# Patient Record
Sex: FEMALE | Race: WHITE | NOT HISPANIC OR LATINO | Employment: FULL TIME | URBAN - METROPOLITAN AREA
[De-identification: names, ages, dates, MRNs, and addresses within clinical notes are randomized per-mention and may not be internally consistent; named-entity substitution may affect disease eponyms.]

---

## 2017-10-28 ENCOUNTER — ANESTHESIA - HEALTHEAST (OUTPATIENT)
Dept: OBGYN | Facility: CLINIC | Age: 32
End: 2017-10-28

## 2017-10-28 ENCOUNTER — RECORDS - HEALTHEAST (OUTPATIENT)
Dept: ADMINISTRATIVE | Facility: OTHER | Age: 32
End: 2017-10-28

## 2017-10-30 ENCOUNTER — HOME CARE/HOSPICE - HEALTHEAST (OUTPATIENT)
Dept: HOME HEALTH SERVICES | Facility: HOME HEALTH | Age: 32
End: 2017-10-30

## 2017-11-01 ENCOUNTER — HOME CARE/HOSPICE - HEALTHEAST (OUTPATIENT)
Dept: HOME HEALTH SERVICES | Facility: HOME HEALTH | Age: 32
End: 2017-11-01

## 2017-11-03 ENCOUNTER — COMMUNICATION - HEALTHEAST (OUTPATIENT)
Dept: OBGYN | Facility: CLINIC | Age: 32
End: 2017-11-03

## 2017-11-20 ENCOUNTER — RECORDS - HEALTHEAST (OUTPATIENT)
Dept: ADMINISTRATIVE | Facility: OTHER | Age: 32
End: 2017-11-20

## 2018-04-17 ENCOUNTER — TELEPHONE (OUTPATIENT)
Dept: OTHER | Facility: CLINIC | Age: 33
End: 2018-04-17

## 2018-04-17 NOTE — TELEPHONE ENCOUNTER
4/17/2018    Call Regarding Onboarding [employer] OTHER    Attempt 1    Message on voicemail     Comments:       Outreach   SB

## 2018-05-09 NOTE — TELEPHONE ENCOUNTER
5/9/2018    Call Regarding Onboarding [employer] [place] [facility] OTHER    Attempt 2    Message on voicemail     Comments:       Outreach   JAZZ

## 2018-06-05 NOTE — TELEPHONE ENCOUNTER
6/5/2018    Call Regarding Onboarding [employer] [place] [facility] OTHER    Attempt 3    Message on voicemail     Comments:       Outreach   JAZZ

## 2019-11-15 ENCOUNTER — RECORDS - HEALTHEAST (OUTPATIENT)
Dept: ADMINISTRATIVE | Facility: OTHER | Age: 34
End: 2019-11-15

## 2020-01-03 ENCOUNTER — ANESTHESIA - HEALTHEAST (OUTPATIENT)
Dept: OBGYN | Facility: CLINIC | Age: 35
End: 2020-01-03

## 2020-01-04 ENCOUNTER — HOME CARE/HOSPICE - HEALTHEAST (OUTPATIENT)
Dept: HOME HEALTH SERVICES | Facility: HOME HEALTH | Age: 35
End: 2020-01-04

## 2021-02-04 ENCOUNTER — TRANSFERRED RECORDS (OUTPATIENT)
Dept: HEALTH INFORMATION MANAGEMENT | Facility: CLINIC | Age: 36
End: 2021-02-04

## 2021-02-05 ENCOUNTER — MEDICAL CORRESPONDENCE (OUTPATIENT)
Dept: HEALTH INFORMATION MANAGEMENT | Facility: CLINIC | Age: 36
End: 2021-02-05

## 2021-02-05 ENCOUNTER — AMBULATORY - HEALTHEAST (OUTPATIENT)
Dept: MATERNAL FETAL MEDICINE | Facility: HOSPITAL | Age: 36
End: 2021-02-05

## 2021-02-05 ENCOUNTER — TRANSCRIBE ORDERS (OUTPATIENT)
Dept: MATERNAL FETAL MEDICINE | Facility: CLINIC | Age: 36
End: 2021-02-05

## 2021-02-05 DIAGNOSIS — O26.90 PREGNANCY RELATED CONDITION, ANTEPARTUM: Primary | ICD-10-CM

## 2021-02-09 ENCOUNTER — TELEPHONE (OUTPATIENT)
Dept: MATERNAL FETAL MEDICINE | Facility: CLINIC | Age: 36
End: 2021-02-09

## 2021-02-09 NOTE — TELEPHONE ENCOUNTER
Writer called and spoke with Gely regarding Gely wanting a 2nd opinion for urology.  [facility] recommends pt see Dr. Talamantes at [facility].  Order was placed in [facility] and Urology was called who said they will call the pt after it is reviewed as they don't have apts in 1-2 weeks Pt has records in Care Every[facility] from [facility]. Pt is seeing Miller this week as well, but would like another opinion. Candi Stringer RN     0 = independent

## 2021-02-10 NOTE — TELEPHONE ENCOUNTER
MEDICAL RECORDS REQUEST   [facility]  Urology Clinic  [address] [place] [address]  PHONE: [phone]  Fax: [phone]        FUTURE VISIT INFORMATION                                                   Gely Christianson : 1985 scheduled for future visit at [facility]    APPOINTMENT INFORMATION:    Date: 2021 10AM    Provider:  Blake LUNA    Reason for Visit/Diagnosis: UPJ     REFERRAL INFORMATION:    Referring provider:  N/A    Specialty: N/A    Referring providers clinic:      Clinic contact number:  N/A    RECORDS REQUESTED FOR VISIT                                                     NOTES  STATUS/DETAILS   OFFICE NOTE from referring provider  yes   OFFICE NOTE from other specialist  no   DISCHARGE SUMMARY from hospital  yes   DISCHARGE REPORT from the ER  yes   OPERATIVE REPORT  no   MEDICATION LIST  no     PRE-VISIT CHECKLIST      Record collection complete Yes  Images in [facility] PACS   MR 21  US ABD 21   Appointment appropriately scheduled           (right time/right provider) Yes   MyChart activation If no, please explain: in process    Questionnaire complete If no, please explain: in process      Action Katey 2/15/21 12:58PM   Action Taken CSS called to inform pt all images are in [facility] PACS for provider to review for consult.      Completed by: Katey Dye

## 2021-02-15 ENCOUNTER — PRE VISIT (OUTPATIENT)
Dept: UROLOGY | Facility: CLINIC | Age: 36
End: 2021-02-15

## 2021-02-19 ENCOUNTER — PRE VISIT (OUTPATIENT)
Dept: UROLOGY | Facility: CLINIC | Age: 36
End: 2021-02-19

## 2021-02-19 ENCOUNTER — VIRTUAL VISIT (OUTPATIENT)
Dept: UROLOGY | Facility: CLINIC | Age: 36
End: 2021-02-19
Payer: COMMERCIAL

## 2021-02-19 VITALS — WEIGHT: 160 LBS | HEIGHT: 68 IN | BODY MASS INDEX: 24.25 KG/M2

## 2021-02-19 DIAGNOSIS — N13.5 UPJ (URETEROPELVIC JUNCTION) OBSTRUCTION: Primary | ICD-10-CM

## 2021-02-19 DIAGNOSIS — Z33.1 PREGNANT STATE, INCIDENTAL: ICD-10-CM

## 2021-02-19 PROCEDURE — 99204 OFFICE O/P NEW MOD 45 MIN: CPT | Mod: GT | Performed by: STUDENT IN AN ORGANIZED HEALTH CARE EDUCATION/TRAINING PROGRAM

## 2021-02-19 RX ORDER — PRENATAL VIT/IRON FUM/FOLIC AC 27MG-0.8MG
1 TABLET ORAL DAILY
COMMUNITY

## 2021-02-19 ASSESSMENT — MIFFLIN-ST. JEOR: SCORE: 1459.26

## 2021-02-19 ASSESSMENT — PAIN SCALES - GENERAL: PAINLEVEL: NO PAIN (0)

## 2021-02-19 NOTE — PROGRESS NOTES
Gely is a 35 year old who is being evaluated via a billable video visit.      How would you like to obtain your AVS? MyChart  If the video visit is dropped, the invitation should be resent by: Send to e-mail at: [email or web address]  Will anyone else be joining your video visit? No      Video Start Time: 10:04 AM  Video-Visit Details    Type of service:  Video Visit    Video End Time:10:22 AM    Originating Location (pt. Location): Home    Distant Location (provider location):  [facility] [place]     Platform used for Video Visit: TeaMobi         Chief Complaint:    Hydronephrosis           Consult or Referral:     Mr. Gely Christianson is a 35 year old female seen at the request of Dr. Jackson.         History of Present Illness:    Gely Christianson is a very pleasant 35 year old female who presents with a history of Hydronephrosis Right Kidney with flank pain.   accompanied by her NA:  Reviewed previous notes from   Gely is currently doing well after that one episode of right flank pain which led to the diagnosis of right UPJ obstruction. SHe has had a MRI and has seen 2 other Urologists at  and [place] and is here to seek our opinion.  Gely is currently 6 weeks pregnant and has important questions to answer regarding the etiology, current management and possible needs for intervention for her UPJ obstruction.               Past Medical History:   No past medical history on file.         Past Surgical History:   No past surgical history on file.         Medications     Current Outpatient Medications   Medication     Prenatal Vit-Fe Fumarate-FA (PRENATAL MULTIVITAMIN W/IRON) 27-0.8 MG tablet     No current facility-administered medications for this visit.             Family History:   No family history on file.         Social History:     Social History     Socioeconomic History     Marital status: Not on file     Spouse name: Not on file     Number of children: Not on file     Years of  education: Not on file     Highest education level: Not on file   Occupational History     Not on file   Social Needs     Financial resource strain: Not on file     Food insecurity     Worry: Not on file     Inability: Not on file     Transportation needs     Medical: Not on file     Non-medical: Not on file   Tobacco Use     Smoking status: Never Smoker     Smokeless tobacco: Never Used   Substance and Sexual Activity     Alcohol use: Not on file     Drug use: Not on file     Sexual activity: Not on file   Lifestyle     Physical activity     Days per week: Not on file     Minutes per session: Not on file     Stress: Not on file   Relationships     Social connections     Talks on phone: Not on file     Gets together: Not on file     Attends Evangelical service: Not on file     Active member of club or organization: Not on file     Attends meetings of clubs or organizations: Not on file     Relationship status: Not on file     Intimate partner violence     Fear of current or ex partner: Not on file     Emotionally abused: Not on file     Physically abused: Not on file     Forced sexual activity: Not on file   Other Topics Concern     Not on file   Social History Narrative     Not on file            Allergies:   Patient has no known allergies.         Review of Systems:  From intake questionnaire     Skin: negative  Eyes: negative  Ears/Nose/Throat: negative  Respiratory: No shortness of breath, dyspnea on exertion, cough, or hemoptysis  Cardiovascular: No chest pain or palpitations  Gastrointestinal: negative; no nausea/vomiting, constipation or diarrhea  Genitourinary: as per HPI  Musculoskeletal: negative  Neurologic: negative  Psychiatric: negative  Hematologic/Lymphatic/Immunologic: negative  Endocrine: negative         Physical Exam:   This is a virtual visit    Alert, no acute distress, oriented, conversant    Ears/nose/mouth: mouth:normal, good dentition  Respiratory: no respiratory distress, or pursed lip  breathing  Cardiovascular:no obvious jugular venous distension present  Skin: no suspicious lesions or rashes on Visible body parts on the Screen  Neuro: Alert, oriented, speech and mentation normal  Psych: affect and mood normal, alert and oriented to person, place and time      Labs and Pathology:    The following labs were reviewed by me and discussed with the patient:  Creatinine: Normal  Significant for : CR:1.0 on 1/31          Imaging:    The following imaging exams were independently viewed and interpreted by me and discussed with patient:  MRI Abd/Pelvis: Abnormal:   EXAM: MR ABDOMEN WITHOUT CONTRAST  LOCATION: [facility] MEDICAL IMAGING  DATE/TIME: 01/30/2021, 12:35 PM    INDICATION: Renal mass, normal renal function.  COMPARISON: Ultrasound abdomen 01/30/2021.  TECHNIQUE: Routine MRI liver protocol including T1 in/out phase, diffusion, multiplane T2, and dynamic T1 with IV contrast.    CONTRAST: None.    FINDINGS:     KIDNEYS:   Right: Severe hydronephrosis with marked thinning of renal parenchyma and abrupt transition at the ureteropelvic junction. Findings should represent chronic high-grade uteropelvic junction obstruction. No soft tissue mass demonstrated.  Left: There are a few cysts in the upper pole which have a benign appearance. No hydronephrosis. Left kidney otherwise negative.    ADDITIONAL FINDINGS: The visualized liver, gallbladder, bile ducts, spleen, pancreas, adrenals, and aorta are all negative. Normal appearance of the bowel in the upper abdomen. Trace amount of free fluid in the pelvic cul-de-sac.    IMPRESSION:  1.  Findings consistent with chronic high-grade right ureteropelvic junction obstruction.             Assessment and Plan:     Assessment:     UPJ (ureteropelvic junction) obstruction  We discussed about the possible congenital etiology and variable time at presentation and the likely modes of presentation for UPJ obstruction.  We also discussed that she may have further symptoms or  may remain asymptomatic during the course of her pregnancy.  IF pain becomes a recurrent issue, and we need to intervene, the choice is between a DJ stent and a Percutaneous nephrostomy. We discussed the pros and the cons of each intervention.  We also discussed about a follow up imaging (US Renal) which she can coordinate at the time of fetal anomaly scan for her baby.  We will obtain a renogram after childbirth in November and I have put orders for the same.  - NM Renogram with Lasix; Future    Pregnant state, incidental        Plan:  USG at the time of anomaly scan   DMSA Renogram after childbirth    Orders  Orders Placed This Encounter   Procedures     NM Renogram with Lasix         Karson Lozano MD  [facility] [place]                  ==========================    Additional Billing and Coding Information:  Review of external notes as documented above   Review of prior external note(s) from [facility] information from [place] reviewed  Review of the result(s) of each unique test - Creatinine    Independent interpretation of a test performed by another physician/other qualified health care professional (not separately reported) - MRI Abdomen and US abdomen    Discussion of management or test interpretation with external physician/other qualified healthcare professional/appropriate source - NA    Diagnosis or treatment significantly limited by social determinants of health - NA    53 minutes spent on the date of the encounter doing chart review, review of outside records, review of test results, interpretation of tests, patient visit and documentation     ==========================

## 2021-02-19 NOTE — PATIENT INSTRUCTIONS
Ultrasound Kidneys at the time of anomaly scan for the pregnancy  Will do Renal scan (Renogram) after childbirth.

## 2021-02-19 NOTE — LETTER
2/19/2021       RE: Gely Christianson  [address] [place] [address]     Dear Colleague,    Thank you for referring your patient, Gely Christianson, to the [facility] [place] at [facility]. Please see a copy of my visit note below.    Gely is a 35 year old who is being evaluated via a billable video visit.      How would you like to obtain your AVS? MyChart  If the video visit is dropped, the invitation should be resent by: Send to e-mail at: [email or web address]  Will anyone else be joining your video visit? No      Video Start Time: 10:04 AM  Video-Visit Details    Type of service:  Video Visit    Video End Time:10:22 AM    Originating Location (pt. Location): Home    Distant Location (provider location):  [facility] [place]     Platform used for Video Visit: TuckerNuck         Chief Complaint:    Hydronephrosis           Consult or Referral:     Mr. Gely Christianson is a 35 year old female seen at the request of Dr. Jackson.         History of Present Illness:    Gely Christianson is a very pleasant 35 year old female who presents with a history of Hydronephrosis Right Kidney with flank pain.   accompanied by her NA:  Reviewed previous notes from   Gely is currently doing well after that one episode of right flank pain which led to the diagnosis of right UPJ obstruction. SHe has had a MRI and has seen 2 other Urologists at  and [place] and is here to seek our opinion.  Gely is currently 6 weeks pregnant and has important questions to answer regarding the etiology, current management and possible needs for intervention for her UPJ obstruction.               Past Medical History:   No past medical history on file.         Past Surgical History:   No past surgical history on file.         Medications     Current Outpatient Medications   Medication     Prenatal Vit-Fe Fumarate-FA (PRENATAL MULTIVITAMIN W/IRON) 27-0.8  MG tablet     No current facility-administered medications for this visit.             Family History:   No family history on file.         Social History:     Social History     Socioeconomic History     Marital status: Not on file     Spouse name: Not on file     Number of children: Not on file     Years of education: Not on file     Highest education level: Not on file   Occupational History     Not on file   Social Needs     Financial resource strain: Not on file     Food insecurity     Worry: Not on file     Inability: Not on file     Transportation needs     Medical: Not on file     Non-medical: Not on file   Tobacco Use     Smoking status: Never Smoker     Smokeless tobacco: Never Used   Substance and Sexual Activity     Alcohol use: Not on file     Drug use: Not on file     Sexual activity: Not on file   Lifestyle     Physical activity     Days per week: Not on file     Minutes per session: Not on file     Stress: Not on file   Relationships     Social connections     Talks on phone: Not on file     Gets together: Not on file     Attends Sabianist service: Not on file     Active member of club or organization: Not on file     Attends meetings of clubs or organizations: Not on file     Relationship status: Not on file     Intimate partner violence     Fear of current or ex partner: Not on file     Emotionally abused: Not on file     Physically abused: Not on file     Forced sexual activity: Not on file   Other Topics Concern     Not on file   Social History Narrative     Not on file            Allergies:   Patient has no known allergies.         Review of Systems:  From intake questionnaire     Skin: negative  Eyes: negative  Ears/Nose/Throat: negative  Respiratory: No shortness of breath, dyspnea on exertion, cough, or hemoptysis  Cardiovascular: No chest pain or palpitations  Gastrointestinal: negative; no nausea/vomiting, constipation or diarrhea  Genitourinary: as per HPI  Musculoskeletal:  negative  Neurologic: negative  Psychiatric: negative  Hematologic/Lymphatic/Immunologic: negative  Endocrine: negative         Physical Exam:   This is a virtual visit    Alert, no acute distress, oriented, conversant    Ears/nose/mouth: mouth:normal, good dentition  Respiratory: no respiratory distress, or pursed lip breathing  Cardiovascular:no obvious jugular venous distension present  Skin: no suspicious lesions or rashes on Visible body parts on the Screen  Neuro: Alert, oriented, speech and mentation normal  Psych: affect and mood normal, alert and oriented to person, place and time      Labs and Pathology:    The following labs were reviewed by me and discussed with the patient:  Creatinine: Normal  Significant for : CR:1.0 on 1/31          Imaging:    The following imaging exams were independently viewed and interpreted by me and discussed with patient:  MRI Abd/Pelvis: Abnormal:   EXAM: MR ABDOMEN WITHOUT CONTRAST  LOCATION: [facility] MEDICAL IMAGING  DATE/TIME: 01/30/2021, 12:35 PM    INDICATION: Renal mass, normal renal function.  COMPARISON: Ultrasound abdomen 01/30/2021.  TECHNIQUE: Routine MRI liver protocol including T1 in/out phase, diffusion, multiplane T2, and dynamic T1 with IV contrast.    CONTRAST: None.    FINDINGS:     KIDNEYS:   Right: Severe hydronephrosis with marked thinning of renal parenchyma and abrupt transition at the ureteropelvic junction. Findings should represent chronic high-grade uteropelvic junction obstruction. No soft tissue mass demonstrated.  Left: There are a few cysts in the upper pole which have a benign appearance. No hydronephrosis. Left kidney otherwise negative.    ADDITIONAL FINDINGS: The visualized liver, gallbladder, bile ducts, spleen, pancreas, adrenals, and aorta are all negative. Normal appearance of the bowel in the upper abdomen. Trace amount of free fluid in the pelvic cul-de-sac.    IMPRESSION:  1.  Findings consistent with chronic high-grade right  ureteropelvic junction obstruction.             Assessment and Plan:     Assessment:     UPJ (ureteropelvic junction) obstruction  We discussed about the possible congenital etiology and variable time at presentation and the likely modes of presentation for UPJ obstruction.  We also discussed that she may have further symptoms or may remain asymptomatic during the course of her pregnancy.  IF pain becomes a recurrent issue, and we need to intervene, the choice is between a DJ stent and a Percutaneous nephrostomy. We discussed the pros and the cons of each intervention.  We also discussed about a follow up imaging (US Renal) which she can coordinate at the time of fetal anomaly scan for her baby.  We will obtain a renogram after childbirth in November and I have put orders for the same.  - NM Renogram with Lasix; Future    Pregnant state, incidental        Plan:  USG at the time of anomaly scan   DMSA Renogram after childbirth    Orders  Orders Placed This Encounter   Procedures     NM Renogram with Lasix         Karson Lozano MD  [facility] [place]                  ==========================    Additional Billing and Coding Information:  Review of external notes as documented above   Review of prior external note(s) from - [facility] information from [place] reviewed  Review of the result(s) of each unique test - Creatinine    Independent interpretation of a test performed by another physician/other qualified health care professional (not separately reported) - MRI Abdomen and US abdomen    Discussion of management or test interpretation with external physician/other qualified healthcare professional/appropriate source - NA    Diagnosis or treatment significantly limited by social determinants of health - NA    53 minutes spent on the date of the encounter doing chart review, review of outside records, review of test results, interpretation of tests, patient visit and documentation      ==========================  Again, thank you for allowing me to participate in the care of your patient.      Sincerely,    Karson Lozano MD

## 2021-02-24 LAB
ABO (EXTERNAL): NORMAL
HEMOGLOBIN (EXTERNAL): 13.2 G/DL (ref 10.5–14)
HEPATITIS B SURFACE ANTIGEN (EXTERNAL): NEGATIVE
HIV1+2 AB SERPL QL IA: NEGATIVE
PLATELET COUNT (EXTERNAL): 174 10E3/UL (ref 110–350)
RH (EXTERNAL): POSITIVE
RUBELLA ANTIBODY IGG (EXTERNAL): NORMAL

## 2021-03-07 ENCOUNTER — HEALTH MAINTENANCE LETTER (OUTPATIENT)
Age: 36
End: 2021-03-07

## 2021-03-19 ENCOUNTER — RECORDS - HEALTHEAST (OUTPATIENT)
Dept: ADMINISTRATIVE | Facility: OTHER | Age: 36
End: 2021-03-19

## 2021-03-24 ENCOUNTER — HOSPITAL ENCOUNTER (OUTPATIENT)
Dept: ULTRASOUND IMAGING | Facility: CLINIC | Age: 36
Discharge: HOME | End: 2021-03-24
Attending: OBSTETRICS & GYNECOLOGY

## 2021-03-24 DIAGNOSIS — O26.839: ICD-10-CM

## 2021-03-26 ENCOUNTER — TRANSCRIBE ORDERS (OUTPATIENT)
Dept: MATERNAL FETAL MEDICINE | Facility: CLINIC | Age: 36
End: 2021-03-26

## 2021-03-26 DIAGNOSIS — O26.90 PREGNANCY RELATED CONDITION, ANTEPARTUM: Primary | ICD-10-CM

## 2021-03-29 DIAGNOSIS — O26.90 PREGNANCY RELATED CONDITION, ANTEPARTUM: Primary | ICD-10-CM

## 2021-04-23 ENCOUNTER — AMBULATORY - HEALTHEAST (OUTPATIENT)
Dept: SCHEDULING | Facility: CLINIC | Age: 36
End: 2021-04-23

## 2021-04-23 DIAGNOSIS — O26.839: ICD-10-CM

## 2021-05-12 ENCOUNTER — PRE VISIT (OUTPATIENT)
Dept: MATERNAL FETAL MEDICINE | Facility: CLINIC | Age: 36
End: 2021-05-12

## 2021-05-14 NOTE — PROGRESS NOTES
Maternal-Fetal Medicine Consultation    Gely Christianson  : 1985  MRN: [id number]    REFERRAL:  Gely Christianson is a 35 year old sent by Dr. Keane from [facility] for MFM consultation.    HPI:  Gely Christianson is a 35 year old  at 21w1d by LMP consistent with 6w0d US here for MFM consultation regarding her recent diagnosis of chronic UPJ obstruction and resulting kidney damage.    In the first trimester of this pregnancy, Gely developed acute onset right flank pain. Renal ultrasound was performed at that time and she was noted to have grade 4 hydronephrosis of the right kidney with minimal remaining parenchyma. The left kidney appeared overall normal. MRI was also performed and and changes were consistent with chronic high grade right ureteropelvic junction obstruction. She had no prior history of renal problems, renal stones, hematuria, or recurrent urinary tract infections.  It was suspected that the change in her kidney was secondary to congenital ureteropelvic junction obstruction. She was hospitalized for her renal pain for just one night after which her pain resolved.      She has also met with Urology at [facility] on 2/10/21.  They concur that her renal changes were consistent with chronic right UPJ obstruction with minimal residual functional parenchyma. Recommendations during pregnancy was for close observation and conservative management given the chronic nature of her disease and her preserved renal function.  They reviewed that if her pain returned, that she would potentially require a percutaneous nephrostomy tube versus indwelling stent.    Today she states that she is feeling well. She has not had any repeat episodes of pain. She has questions regarding the COVID vaccine in pregnancy.    Prenatal Care:  Primary OB care this pregnancy has been with Dr. Keane from [facility].    Obstetrics History:  OB History    Para Term  " AB Living   3 2 2 0 0 2   SAB TAB Ectopic Multiple Live Births   0 0 0 0 2      # Outcome Date GA Lbr Shaq/2nd Weight Sex Delivery Anes PTL Lv   3 Current            2 Term 20 41w0d       JASS   1 Term 10/29/17 40w0d    Vag-Vacuum   JASS       Past Medical History:  Past Medical History:   Diagnosis Date     Renal disease     chronic UPJ obstruction of right kidney       Past Surgical History:  Past Surgical History:   Procedure Laterality Date     TOOTH EXTRACTION         Current Medications:  Prior to Admission medications    Medication Sig Last Dose Taking? Auth Provider   Prenatal Vit-Fe Fumarate-FA (PRENATAL MULTIVITAMIN W/IRON) 27-0.8 MG tablet Take 1 tablet by mouth daily   Reported, Patient       Allergies:  Patient has no known allergies.    Social History:   , two previous children  Denies tobacco, alcohol, or other illicit drug use.    Family History:  Non-contributory    ROS:  10-point ROS negative except as in HPI     PHYSICAL EXAM:  Deferred     Imaging:   Please see \"Imaging\" tab under \"Chart Review\" for details of today's US at the [facility].    ASSESSMENT/PLAN:  Gely Christianson is a 35 year old  at 21w1d by LMP consistent with 6w0d US here for [facility] consultation regarding her recent diagnosis of chronic UPJ obstruction and resulting kidney damage.    Renal disease in pregnancy  - Reviewed the chronic nature of her disease that has resulted in atrophic right kidney.  Reiterated that this was likely a congenital process that was undetected and overtime resulted in these changes.  Agree with urological assessment that expectant management at this time is most appropriate. Reviewed that physiological changes in pregnancy could result in recurrent or worsened pain. If this does develop interventions as discussed by urology would be available.  - From our perspective, we have reviewed the anatomy of her current fetus and the renal anatomy appears normal suggesting that " there is no UPJ obstruction for this baby.  In addition, we reviewed that our goal was to not only take care of any pain that develops from her chronic renal changes, but also to protect her working kidney. Therefore, it will be important to have a low threshold to screen for urinary tract infection.  We reviewed if a urinary tract infection did develop, it would be recommended that she be started on antibiotic prophylaxis in pregnancy as a precautionary measure. We also reviewed the recommendation for a daily 81 mg aspirin for preeclampsia prophylaxis due to her advanced maternal age and renal disease.    COVID vaccination in pregnancy  - We also discussed the safety of the mRNA Covid vaccine and that overall the risks of the virus, particularly to pregnant patients, are much greater than potential vaccine side effects. The pro and unknowns of Covid vaccination and safe practices to minimize infection (masking, social distancing, hand washing, avoidance of large gatherings) were also reviewed. She will consider as pregnancy progresses and is encouraged to ask more questions of her providers along the way.  Discussed the [place] Journal of Medicine article on the study which reviewed the pregnancy registry of over 30,000 women, which found that there was not an increased risk of miscarriage, still birth, congenital malformations or adverse  outcomes like  birth, small for gestational age or  deaths in patients who had received the vaccine.     Recommendations:  - COVID vaccinations encouraged  - Low dose aspirin for preeclampsia prevention to be initiated now  - Careful screening for urinary tract infection. Low threshold to screen and/or treat for UTI. If she does develop a urinary tract infection it is recommended that she be put on antibiotic prophylaxis/suppresion for the remainder of her pregnancy.  - If pain recurs she will need to be seen by urology for evaluation for PNT/ureteral  stent per their recommendation  - At this time there is no indication for an early delivery or other changes to obstetrical care.    The patient was seen and evaluated with Dr. Monzon    Thank you for allowing us to participate in the care of your patient. Please do not hesitate to contact us if you have further questions regarding the management of your patient.     Cray Nelson MD  Maternal Fetal Medicine Fellow  5/18/2021 4:25 PM

## 2021-05-18 ENCOUNTER — HOSPITAL ENCOUNTER (OUTPATIENT)
Dept: ULTRASOUND IMAGING | Facility: CLINIC | Age: 36
End: 2021-05-18
Attending: OBSTETRICS & GYNECOLOGY
Payer: COMMERCIAL

## 2021-05-18 ENCOUNTER — OFFICE VISIT (OUTPATIENT)
Dept: MATERNAL FETAL MEDICINE | Facility: CLINIC | Age: 36
End: 2021-05-18
Attending: OBSTETRICS & GYNECOLOGY
Payer: COMMERCIAL

## 2021-05-18 VITALS
SYSTOLIC BLOOD PRESSURE: 115 MMHG | RESPIRATION RATE: 18 BRPM | OXYGEN SATURATION: 98 % | DIASTOLIC BLOOD PRESSURE: 59 MMHG | HEART RATE: 65 BPM

## 2021-05-18 DIAGNOSIS — O09.522 SUPERVISION OF ELDERLY MULTIGRAVIDA IN SECOND TRIMESTER: Primary | ICD-10-CM

## 2021-05-18 DIAGNOSIS — O26.90 PREGNANCY RELATED CONDITION, ANTEPARTUM: ICD-10-CM

## 2021-05-18 PROCEDURE — 76811 OB US DETAILED SNGL FETUS: CPT | Mod: 26 | Performed by: OBSTETRICS & GYNECOLOGY

## 2021-05-18 PROCEDURE — 96040 HC GENETIC COUNSELING, EACH 30 MINUTES: CPT | Performed by: GENETIC COUNSELOR, MS

## 2021-05-18 PROCEDURE — 99202 OFFICE O/P NEW SF 15 MIN: CPT | Mod: 25 | Performed by: OBSTETRICS & GYNECOLOGY

## 2021-05-18 PROCEDURE — 76811 OB US DETAILED SNGL FETUS: CPT

## 2021-05-18 NOTE — NURSING NOTE
Gely seen in clinic today for L2 (See ultrasound report), GC (see note), and MFM Consult at 21w1d gestation for pregnancy complicated by AMA & UPJ Obstruction (see report/notes). Medications, allergies, and goals for appt discussed. VSS. Dr. Monzon and Dr. Nelson met with pt and discussed POC (see note). Plan for labs to be drawn, . Future visits scheduled at . Pt discharged stable and ambulatory.

## 2021-05-18 NOTE — PROGRESS NOTES
[facility]  Genetic Counseling Consult    Patient:  Gely Christianson YOB: 1985   Date of Service:  21      Gely Christianson was seen at the [facility] [place] for genetic consultation as part of her appointment for comprehensive ultrasound.  The indication for genetic counseling is advanced maternal age and personal history of UPJ obstruction. She was accompanied by her partner, Ean.        Impression/Plan:   1. Gely has not had serum screening in this pregnancy.     2. Gely had a comprehensive (level II) ultrasound today.  Please see the ultrasound report for further details.    3. The patient declines genetic amniocentesis and maternal serum screening today.     4. Gely had a Maternal Fetal Medicine consultation to discuss her personal history of UPJ obstruction and hydronephrosis.     Pregnancy History:   /Parity:     Age at Delivery: 36 year old  DUARTE: 2021, by Last Menstrual Period  Gestational Age: 21w1d    No significant complications or exposures were reported in the current pregnancy.    Gely s pregnancy history is significant for two full term deliveries, healthy infants    Medical History:   Gely had a chronic ureteropelvic junction (UPJ) obstruction with severe hydronephrosis. She follows the urology and has not had renal issues in this pregnancy. Gely had a [facility] consult to discuss this in more detail. Please see the MFM consult note for more details.        Family History:   A three-generation pedigree was obtained, and is scanned under the  Media  tab.   The following significant findings were reported by Gely:    The father of the pregnancy, Ean, 36, is healthy.     Dori has one daughter (1) and one son (3), both are healthy.       Both Dori have a very significant family history of cancer       Gely's maternal aunt had breast cancer over 50    Gely's maternal  grandmother had breast cancer in her 50s and then ovarian cancer. She  at 94    Per Gely's information, her mother and aunt had negative genetic testing but she is unsure what genes they were tested for. We discussed that a negative test does not erase the family history.      Gely's paternal aunt had breast cancer in her late 40s or early 50s. She has not had genetic testing.    Gely's paternal grandmother had breast cancer over 50.      Ean's mother had colon cancer in her 40s. She also has a brother (Ean's uncle) with a history of colon cancer. Ean believes his mother had some limited genetic testing. We discussed since this diagnosis was around twenty years ago, the testing may have been done somatically on the tumor. There is a chance they did not do germline cancer gene testing. Ean reports he has had two colonoscopies already    Ean's maternal aunt had breast cancer and  unrelated to cancer at 86. That aunt had two daughters and one son. Both daughters  from breast cancer at 37 and 53. He is unsure if this cousins did any genetic testing      Ean's father had prostate cancer in his 60s    Ean has two paternal aunts/uncles with a history of brain tumors, he believes were benign. One of those aunts with a brain tumor may have also had breast cancer.     We discussed the family history of breast cancer briefly. Cancer most often occurs by chance, however some families seem to develop cancer more frequently than expected. Everyone has a risk to develop cancer, but individuals may be at an increased risk to develop cancer based on their family history.We discussed that ovarian, multiple breast and younger breast and colon cancer is rare and can be associated with inherited cancer predisposition syndromes. Genetic counseling is available for cancer syndromes. Cancer family history, even without genetic testing, can change cancer screening recommendations for family members and  "aid in insurance coverage for access to them as well. The most informative individuals to complete cancer genetic counseling and genetic testing are those with a personal history of cancer or those closely related to the affected individuals. This may be Gely's aunts and Ean's mother and/or aunts.    If the family wants more information they can contact the [facility] Cancer Risk Management Program ([phone]). Physicians can also make referrals at [email or web address] or, if within the [place] system, through [facility] referral for \"Cancer Risk Mgmt/Cancer Genetic Counseling\" Dori was provided a brochure on the Cancer Risk Management Program.    Indications to consider the program include a personal or family history of:  Breast cancer before age 50  Multiple close relatives with breast cancer  Triple negative breast cancer at any age  Ovarian cancer at any age  Male breast cancer  Ashkenazi Restorationism ancestry and breast, pancreatic, ovarian cancer, or prostate cancer with a [place] score of 7+ at any age    Indications to consider the program include a personal or family history of:  Colon or rectal cancer before age 50  Endometrial cancer before age 50  20+ adenomatous polyps over lifetime  Multiple close relatives with colon or endometrial cancer     Indications to consider the program include a personal or immediate family history of:  Two or more melanomas  Two or more pancreatic cancers  Two or more sarcomas or brain tumors  Medullary thyroid cancer, pheochromocytoma, adrenal or kidney cancer  Retinoblastoma      Gely's maternal uncle was born with Down syndrome    We discussed that about 95% of cases with Down syndrome are sporadic, meaning they only affect that pregnancy and there is not family history. This type of Down syndrome is caused by trisomy 21 due to non-disjunction. We also explained there is a less common type of Down syndrome " that can be inherited and could put family members at an increased risk for Down syndrome. This type of Down syndrome is caused by a chromosomal translocation in which a part of chromosome 21 is attached to another chromosome.     This family history is unlikely to increase risks for Gely and Ean Mckoy has two paternal cousins that  at a few days old. That aunt also has a few healthy children. Ean is unsure of exactly what the diagnosis was. Health conditions in siblings are typically suspicious for an autosomal recessive condition which would require Ean's aunt and her partner to be carriers. This could put Ean at risk to be a carrier for the condition. However, there would be a small chance this would occur in Ean's children unless Gely had a family history of was a carrier, which is unlikely. If more information is gathered regarding this individual it would be reasonable to revisit this family history for a more accurate risk assessment.       Ean's paternal aunt with lupus has a history of kidney transplants. She also has two children with some delays or intellectual disability that may be due to medication she was taking. If more information is gathered regarding this individual it would be reasonable to revisit this family history for a more accurate risk assessment.     Otherwise, the reported family history is negative for multiple miscarriages, stillbirths, birth defects, mental retardation, known genetic conditions, and consanguinity.       Carrier Screening:   The patient reports that she and the father of the pregnancy have  ancestry:      Cystic fibrosis is an autosomal recessive genetic condition that occurs with increased frequency in individuals of  ancestry and carrier screening for this condition is available.  In addition,  screening in the [employer] includes cystic fibrosis.      Expanded carrier screening for mutations in a large  panel of genes associated with autosomal recessive conditions including cystic fibrosis, spinal muscular atrophy, and others, is now available.      The patient has declined the carrier screening options reviewed today. We discussed carrier screening in great detail. Gely was concerned about potentially being carriers for a condition and how knowing that information may help with the diagnosis of a child's health problems. We discussed if a child presented with a health problem, genetics would evaluate and do testing right away to diagnosis the genetic condition. In some cases genetics would then recommend parental testing to check parental carrier status. However, children can still be effectively diagnosed even if the parents have not had carrier screening. We discussed that some couples choose carrier screening so they can do embryo testing via IVF or do diagnostic testing and possible pregnancy management, such as termination. Gely and Ean are confident they would not pursue diagnostic testing or termination. We also discussed that for most conditions on the list, there is no treatment or cure that can be started prenatal or in infancy. We discussed the  screening program in detail and they feel comfortable with that level of testing.        Risk Assessment for Chromosome Conditions:   We explained that the risk for fetal chromosome abnormalities increases with maternal age. We discussed specific features of common chromosome abnormalities, including Down syndrome, trisomy 13, trisomy 18, and sex chromosome trisomies.      At age 36 at midtrimester, the risk to have a baby with Down syndrome is 1 in 129.     At age 36 at midtrimester, the risk to have a baby with any chromosome abnormality is 1 in 65.       Gely did not have maternal serum screening earlier in pregnancy.     Testing Options:   We discussed the following options:   Non-invasive Prenatal Testing (NIPT)    Maternal plasma cell-free DNA  testing; first trimester ultrasound with nuchal translucency and nasal bone assessment is recommended, when appropriate    Screens for fetal trisomy 21, trisomy 13, trisomy 18, and sex chromosome aneuploidy    Cannot screen for open neural tube defects; maternal serum AFP after 15 weeks is recommended     Genetic Amniocentesis    Invasive procedure typically performed in the second trimester by which amniotic fluid is obtained for the purpose of chromosome analysis and/or other prenatal genetic analysis    Diagnostic results; >99% sensitivity for fetal chromosome abnormalities    AFAFP measurement tests for open neural tube defects     Comprehensive (Level II) ultrasound: Detailed ultrasound performed between 18-22 weeks gestation to screen for major birth defects and markers for aneuploidy.    We reviewed the benefits and limitations of this testing.  Screening tests provide a risk assessment specific to the pregnancy for certain fetal chromosome abnormalities, but cannot definitively diagnose or exclude a fetal chromosome abnormality.  Follow-up genetic counseling and consideration of diagnostic testing is recommended with any abnormal screening result.     Diagnostic tests carry inherent risks- including risk of miscarriage- that require careful consideration.  These tests can detect fetal chromosome abnormalities with greater than 99% certainty.  Results can be compromised by maternal cell contamination or mosaicism, and are limited by the resolution of cytogenetic G-banding technology.  There is no screening nor diagnostic test that can detect all forms of birth defects or mental disability.    It was a pleasure to be involved with Gely [facility]. Face-to-face time of the meeting was 45 minutes.      Shawna River MS, [facility]  Licensed Genetic Counselor   [facility]  Maternal Fetal Medicine  [email or web address]  [email or web address]  Office: [phone]  Pager [phone]  MFM: [phone]   Fax:  [phone]

## 2021-06-04 NOTE — ANESTHESIA PREPROCEDURE EVALUATION
Anesthesia Evaluation      No history of anesthetic complications     Airway   Mallampati: I  Neck ROM: full   Pulmonary - negative ROS                          Cardiovascular - negative ROS   Neuro/Psych - negative ROS     Endo/Other    (+) pregnant     GI/Hepatic/Renal - negative ROS           Dental - normal exam                        Anesthesia Plan  Planned anesthetic: epidural    ASA 2     Anesthetic plan and risks discussed with: patient    Post-op plan: routine recovery

## 2021-06-04 NOTE — ANESTHESIA POSTPROCEDURE EVALUATION
Patient: Gely Christianson  * No procedures listed *  Anesthesia type: epidural    Patient location: Labor and Delivery  Last vitals: No vitals data found for the desired time range.    Post vital signs: stable  Level of consciousness: awake and responds to simple questions  Post-anesthesia pain: pain controlled  Post-anesthesia nausea and vomiting: no  Pulmonary: unassisted, return to baseline  Cardiovascular: stable and blood pressure at baseline  Hydration: adequate  Anesthetic events: no    QCDR Measures:  ASA# 11 - Cait-op Cardiac Arrest: ASA11B - Patient did NOT experience unanticipated cardiac arrest  ASA# 12 - Cait-op Mortality Rate: ASA12B - Patient did NOT die  ASA# 13 - PACU Re-Intubation Rate: NA - No ETT / LMA used for case  ASA# 10 - Composite Anes Safety: ASA10A - No serious adverse event    Additional Notes:Pt with labor epidural that functioned adequately.  No complaints of HA or backache.  She has walked and voided.    She has no further questions.

## 2021-06-04 NOTE — ANESTHESIA PROCEDURE NOTES
Epidural Block    Patient location during procedure: OB  Time Called: 1/3/2020 12:58 AM  Reason for Block:labor epidural  Staffing:  Performing  Anesthesiologist: Lesly Hearn MD  Preanesthetic Checklist  Completed: patient identified, risks, benefits, and alternatives discussed, timeout performed, consent obtained, at patient's request, airway assessed, oxygen available, suction available, emergency drugs available and hand hygiene performed  Procedure  Patient position: sitting  Prep: ChloraPrep and site prepped and draped  Patient monitoring: continuous pulse oximetry, heart rate and blood pressure  Approach: midline  Location: L2-L3  Injection technique: NEMO saline  Number of Attempts:1  Needle  Needle type: Lisandro   Needle gauge: 18 G     Catheter in Space: 4  Assessment  Sensory level:  No complications

## 2021-06-13 NOTE — ANESTHESIA PROCEDURE NOTES
Epidural Block    Patient location during procedure: OB  Time Called: 10/28/2017 11:48 PM  Reason for Block:labor epidural  Staffing:  Performing  Anesthesiologist: KERI DAVIDSON  Preanesthetic Checklist  Completed: patient identified, risks, benefits, and alternatives discussed, timeout performed, consent obtained, airway assessed, oxygen available, suction available, emergency drugs available and hand hygiene performed  Procedure  Patient position: sitting  Prep: ChloraPrep  Patient monitoring: continuous pulse oximetry  Approach: midline  Location: L2-L3  Injection technique: NEMO air  Number of Attempts:1  Needle  Needle type: [place]   Needle gauge: 18 G     Catheter in Space: 3  Assessment  Sensory level:  No complications      Additional Notes:  Single pass. No paresthesia. No heme. Neg aspirations.

## 2021-06-13 NOTE — ANESTHESIA PREPROCEDURE EVALUATION
Anesthesia Evaluation      Patient summary reviewed   No history of anesthetic complications     Airway   Mallampati: II   Pulmonary - negative ROS and normal exam                          Cardiovascular - negative ROS and normal exam  Exercise tolerance: > or = 10 METS   Neuro/Psych - negative ROS     Endo/Other - negative ROS   (+) pregnant     GI/Hepatic/Renal - negative ROS           Dental - normal exam                        Anesthesia Plan  Planned anesthetic: epidural  Anesthesiology Labor Epidural Note    Called to place a labor epidural in this patient in L&D.  Patient ID, interviewed and examined at the bedside with RN present throughout. Discussed with patient the procedure of epidural placement, expectations and risks (bleeding, infection, headache, decreased blood pressure, high block with LOC, and poor analgesia possibly requiring replacement).  I have answered all questions.  She consents to proceed with epidural placement.  See epidural procedure note.    Akbar Garcia M.D.  ASA 2     Anesthetic plan and risks discussed with: patient    Post-op plan: epidural analgesia

## 2021-06-13 NOTE — ANESTHESIA POSTPROCEDURE EVALUATION
Patient: Gely Christianson  * No procedures listed *  Anesthesia type: epidural    Patient location: Labor and Delivery  Last vitals:   Vitals:    10/29/17 0655   BP: 103/63   Pulse: 89   Resp: 16   Temp:    SpO2:      Post vital signs: stable  Level of consciousness: awake and responds to simple questions  Post-anesthesia pain: pain controlled  Post-anesthesia nausea and vomiting: no  Pulmonary: unassisted, return to baseline  Cardiovascular: stable and blood pressure at baseline  Hydration: adequate  Anesthetic events: no    QCDR Measures:  ASA# 11 - Cait-op Cardiac Arrest: ASA11B - Patient did NOT experience unanticipated cardiac arrest  ASA# 12 - Cait-op Mortality Rate: ASA12B - Patient did NOT die  ASA# 13 - PACU Re-Intubation Rate: NA - No ETT / LMA used for case  ASA# 10 - Composite Anes Safety: ASA10A - No serious adverse event    Additional Notes:  No apparent complications from labor epidural

## 2021-06-16 PROBLEM — Z34.90 PREGNANT: Status: ACTIVE | Noted: 2017-10-28

## 2021-08-12 ENCOUNTER — HOSPITAL ENCOUNTER (OUTPATIENT)
Dept: ULTRASOUND IMAGING | Facility: CLINIC | Age: 36
Discharge: HOME | End: 2021-08-12
Attending: OBSTETRICS & GYNECOLOGY | Admitting: OBSTETRICS & GYNECOLOGY
Payer: COMMERCIAL

## 2021-08-12 DIAGNOSIS — O26.839: ICD-10-CM

## 2021-08-12 PROCEDURE — 76770 US EXAM ABDO BACK WALL COMP: CPT

## 2021-09-03 LAB — GROUP B STREPTOCOCCUS (EXTERNAL): POSITIVE

## 2021-09-28 DIAGNOSIS — Z33.1 PREGNANCY, INCIDENTAL: Primary | ICD-10-CM

## 2021-10-04 ENCOUNTER — LAB (OUTPATIENT)
Dept: LAB | Facility: CLINIC | Age: 36
End: 2021-10-04
Attending: OBSTETRICS & GYNECOLOGY
Payer: COMMERCIAL

## 2021-10-04 DIAGNOSIS — Z33.1 PREGNANCY, INCIDENTAL: ICD-10-CM

## 2021-10-04 LAB — SARS-COV-2 RNA RESP QL NAA+PROBE: NEGATIVE

## 2021-10-04 PROCEDURE — U0003 INFECTIOUS AGENT DETECTION BY NUCLEIC ACID (DNA OR RNA); SEVERE ACUTE RESPIRATORY SYNDROME CORONAVIRUS 2 (SARS-COV-2) (CORONAVIRUS DISEASE [COVID-19]), AMPLIFIED PROBE TECHNIQUE, MAKING USE OF HIGH THROUGHPUT TECHNOLOGIES AS DESCRIBED BY CMS-2020-01-R: HCPCS

## 2021-10-04 PROCEDURE — U0005 INFEC AGEN DETEC AMPLI PROBE: HCPCS

## 2021-10-07 ENCOUNTER — HOSPITAL ENCOUNTER (INPATIENT)
Facility: CLINIC | Age: 36
LOS: 2 days | Discharge: HOME HEALTH | End: 2021-10-09
Attending: OBSTETRICS & GYNECOLOGY | Admitting: OBSTETRICS & GYNECOLOGY
Payer: COMMERCIAL

## 2021-10-07 ENCOUNTER — ANESTHESIA EVENT (OUTPATIENT)
Dept: OBGYN | Facility: CLINIC | Age: 36
End: 2021-10-07
Payer: COMMERCIAL

## 2021-10-07 ENCOUNTER — ANESTHESIA (OUTPATIENT)
Dept: OBGYN | Facility: CLINIC | Age: 36
End: 2021-10-07
Payer: COMMERCIAL

## 2021-10-07 PROBLEM — Z36.89 ENCOUNTER FOR TRIAGE IN PREGNANT PATIENT: Status: ACTIVE | Noted: 2021-10-07

## 2021-10-07 PROBLEM — Z34.90 ENCOUNTER FOR ELECTIVE INDUCTION OF LABOR: Status: ACTIVE | Noted: 2021-10-07

## 2021-10-07 LAB
ABO/RH(D): NORMAL
ANTIBODY SCREEN: NEGATIVE
HOLD SPECIMEN: NORMAL
SPECIMEN EXPIRATION DATE: NORMAL
T PALLIDUM AB SER QL: NEGATIVE

## 2021-10-07 PROCEDURE — 258N000003 HC RX IP 258 OP 636: Performed by: OBSTETRICS & GYNECOLOGY

## 2021-10-07 PROCEDURE — 86900 BLOOD TYPING SEROLOGIC ABO: CPT | Performed by: OBSTETRICS & GYNECOLOGY

## 2021-10-07 PROCEDURE — 250N000011 HC RX IP 250 OP 636: Performed by: ANESTHESIOLOGY

## 2021-10-07 PROCEDURE — 722N000001 HC LABOR CARE VAGINAL DELIVERY SINGLE

## 2021-10-07 PROCEDURE — 86780 TREPONEMA PALLIDUM: CPT | Performed by: OBSTETRICS & GYNECOLOGY

## 2021-10-07 PROCEDURE — 10907ZC DRAINAGE OF AMNIOTIC FLUID, THERAPEUTIC FROM PRODUCTS OF CONCEPTION, VIA NATURAL OR ARTIFICIAL OPENING: ICD-10-PCS | Performed by: OBSTETRICS & GYNECOLOGY

## 2021-10-07 PROCEDURE — 10S0XZZ REPOSITION PRODUCTS OF CONCEPTION, EXTERNAL APPROACH: ICD-10-PCS | Performed by: OBSTETRICS & GYNECOLOGY

## 2021-10-07 PROCEDURE — 3E033VJ INTRODUCTION OF OTHER HORMONE INTO PERIPHERAL VEIN, PERCUTANEOUS APPROACH: ICD-10-PCS | Performed by: OBSTETRICS & GYNECOLOGY

## 2021-10-07 PROCEDURE — 250N000009 HC RX 250: Performed by: ANESTHESIOLOGY

## 2021-10-07 PROCEDURE — 370N000003 HC ANESTHESIA WARD SERVICE

## 2021-10-07 PROCEDURE — 250N000011 HC RX IP 250 OP 636: Performed by: OBSTETRICS & GYNECOLOGY

## 2021-10-07 PROCEDURE — 36415 COLL VENOUS BLD VENIPUNCTURE: CPT | Performed by: OBSTETRICS & GYNECOLOGY

## 2021-10-07 PROCEDURE — 120N000001 HC R&B MED SURG/OB

## 2021-10-07 PROCEDURE — 250N000009 HC RX 250: Performed by: OBSTETRICS & GYNECOLOGY

## 2021-10-07 RX ORDER — OXYTOCIN/0.9 % SODIUM CHLORIDE 30/500 ML
100-340 PLASTIC BAG, INJECTION (ML) INTRAVENOUS CONTINUOUS PRN
Status: DISCONTINUED | OUTPATIENT
Start: 2021-10-07 | End: 2021-10-09 | Stop reason: HOSPADM

## 2021-10-07 RX ORDER — KETOROLAC TROMETHAMINE 30 MG/ML
30 INJECTION, SOLUTION INTRAMUSCULAR; INTRAVENOUS
Status: DISCONTINUED | OUTPATIENT
Start: 2021-10-07 | End: 2021-10-09 | Stop reason: HOSPADM

## 2021-10-07 RX ORDER — ONDANSETRON 4 MG/1
4 TABLET, ORALLY DISINTEGRATING ORAL EVERY 6 HOURS PRN
Status: DISCONTINUED | OUTPATIENT
Start: 2021-10-07 | End: 2021-10-07 | Stop reason: HOSPADM

## 2021-10-07 RX ORDER — OXYTOCIN/0.9 % SODIUM CHLORIDE 30/500 ML
340 PLASTIC BAG, INJECTION (ML) INTRAVENOUS CONTINUOUS PRN
Status: DISCONTINUED | OUTPATIENT
Start: 2021-10-07 | End: 2021-10-07 | Stop reason: HOSPADM

## 2021-10-07 RX ORDER — OXYTOCIN/0.9 % SODIUM CHLORIDE 30/500 ML
340 PLASTIC BAG, INJECTION (ML) INTRAVENOUS CONTINUOUS PRN
Status: DISCONTINUED | OUTPATIENT
Start: 2021-10-07 | End: 2021-10-09 | Stop reason: HOSPADM

## 2021-10-07 RX ORDER — NALOXONE HYDROCHLORIDE 0.4 MG/ML
0.2 INJECTION, SOLUTION INTRAMUSCULAR; INTRAVENOUS; SUBCUTANEOUS
Status: DISCONTINUED | OUTPATIENT
Start: 2021-10-07 | End: 2021-10-07 | Stop reason: HOSPADM

## 2021-10-07 RX ORDER — ONDANSETRON 2 MG/ML
4 INJECTION INTRAMUSCULAR; INTRAVENOUS EVERY 6 HOURS PRN
Status: DISCONTINUED | OUTPATIENT
Start: 2021-10-07 | End: 2021-10-07 | Stop reason: HOSPADM

## 2021-10-07 RX ORDER — MISOPROSTOL 200 UG/1
400 TABLET ORAL
Status: DISCONTINUED | OUTPATIENT
Start: 2021-10-07 | End: 2021-10-07 | Stop reason: HOSPADM

## 2021-10-07 RX ORDER — PROCHLORPERAZINE 25 MG
25 SUPPOSITORY, RECTAL RECTAL EVERY 12 HOURS PRN
Status: DISCONTINUED | OUTPATIENT
Start: 2021-10-07 | End: 2021-10-07 | Stop reason: HOSPADM

## 2021-10-07 RX ORDER — METHYLERGONOVINE MALEATE 0.2 MG/ML
200 INJECTION INTRAVENOUS
Status: DISCONTINUED | OUTPATIENT
Start: 2021-10-07 | End: 2021-10-07 | Stop reason: HOSPADM

## 2021-10-07 RX ORDER — LIDOCAINE 40 MG/G
CREAM TOPICAL
Status: DISCONTINUED | OUTPATIENT
Start: 2021-10-07 | End: 2021-10-07 | Stop reason: HOSPADM

## 2021-10-07 RX ORDER — PENICILLIN G POTASSIUM 5000000 [IU]/1
5 INJECTION, POWDER, FOR SOLUTION INTRAMUSCULAR; INTRAVENOUS ONCE
Status: COMPLETED | OUTPATIENT
Start: 2021-10-07 | End: 2021-10-07

## 2021-10-07 RX ORDER — NALOXONE HYDROCHLORIDE 0.4 MG/ML
0.4 INJECTION, SOLUTION INTRAMUSCULAR; INTRAVENOUS; SUBCUTANEOUS
Status: DISCONTINUED | OUTPATIENT
Start: 2021-10-07 | End: 2021-10-07 | Stop reason: HOSPADM

## 2021-10-07 RX ORDER — ACETAMINOPHEN 325 MG/1
650 TABLET ORAL EVERY 4 HOURS PRN
Status: DISCONTINUED | OUTPATIENT
Start: 2021-10-07 | End: 2021-10-09 | Stop reason: HOSPADM

## 2021-10-07 RX ORDER — SODIUM CHLORIDE, SODIUM LACTATE, POTASSIUM CHLORIDE, CALCIUM CHLORIDE 600; 310; 30; 20 MG/100ML; MG/100ML; MG/100ML; MG/100ML
INJECTION, SOLUTION INTRAVENOUS CONTINUOUS
Status: DISCONTINUED | OUTPATIENT
Start: 2021-10-07 | End: 2021-10-07 | Stop reason: HOSPADM

## 2021-10-07 RX ORDER — IBUPROFEN 600 MG/1
600 TABLET, FILM COATED ORAL
Status: DISCONTINUED | OUTPATIENT
Start: 2021-10-07 | End: 2021-10-09 | Stop reason: HOSPADM

## 2021-10-07 RX ORDER — HYDROCORTISONE 2.5 %
CREAM (GRAM) TOPICAL 3 TIMES DAILY PRN
Status: DISCONTINUED | OUTPATIENT
Start: 2021-10-07 | End: 2021-10-09 | Stop reason: HOSPADM

## 2021-10-07 RX ORDER — METOCLOPRAMIDE 10 MG/1
10 TABLET ORAL EVERY 6 HOURS PRN
Status: DISCONTINUED | OUTPATIENT
Start: 2021-10-07 | End: 2021-10-07 | Stop reason: HOSPADM

## 2021-10-07 RX ORDER — PROCHLORPERAZINE MALEATE 10 MG
10 TABLET ORAL EVERY 6 HOURS PRN
Status: DISCONTINUED | OUTPATIENT
Start: 2021-10-07 | End: 2021-10-07 | Stop reason: HOSPADM

## 2021-10-07 RX ORDER — LIDOCAINE HYDROCHLORIDE AND EPINEPHRINE 15; 5 MG/ML; UG/ML
3 INJECTION, SOLUTION EPIDURAL
Status: COMPLETED | OUTPATIENT
Start: 2021-10-07 | End: 2021-10-07

## 2021-10-07 RX ORDER — METOCLOPRAMIDE HYDROCHLORIDE 5 MG/ML
10 INJECTION INTRAMUSCULAR; INTRAVENOUS EVERY 6 HOURS PRN
Status: DISCONTINUED | OUTPATIENT
Start: 2021-10-07 | End: 2021-10-07 | Stop reason: HOSPADM

## 2021-10-07 RX ORDER — BUPIVACAINE HYDROCHLORIDE 2.5 MG/ML
INJECTION, SOLUTION EPIDURAL; INFILTRATION; INTRACAUDAL
Status: COMPLETED | OUTPATIENT
Start: 2021-10-07 | End: 2021-10-07

## 2021-10-07 RX ORDER — CARBOPROST TROMETHAMINE 250 UG/ML
250 INJECTION, SOLUTION INTRAMUSCULAR
Status: DISCONTINUED | OUTPATIENT
Start: 2021-10-07 | End: 2021-10-09 | Stop reason: HOSPADM

## 2021-10-07 RX ORDER — OXYTOCIN 10 [USP'U]/ML
10 INJECTION, SOLUTION INTRAMUSCULAR; INTRAVENOUS
Status: DISCONTINUED | OUTPATIENT
Start: 2021-10-07 | End: 2021-10-07 | Stop reason: HOSPADM

## 2021-10-07 RX ORDER — OXYTOCIN/0.9 % SODIUM CHLORIDE 30/500 ML
1-24 PLASTIC BAG, INJECTION (ML) INTRAVENOUS CONTINUOUS
Status: DISCONTINUED | OUTPATIENT
Start: 2021-10-07 | End: 2021-10-07 | Stop reason: HOSPADM

## 2021-10-07 RX ORDER — BISACODYL 10 MG
10 SUPPOSITORY, RECTAL RECTAL DAILY PRN
Status: DISCONTINUED | OUTPATIENT
Start: 2021-10-07 | End: 2021-10-09 | Stop reason: HOSPADM

## 2021-10-07 RX ORDER — MISOPROSTOL 200 UG/1
400 TABLET ORAL
Status: DISCONTINUED | OUTPATIENT
Start: 2021-10-07 | End: 2021-10-09 | Stop reason: HOSPADM

## 2021-10-07 RX ORDER — MODIFIED LANOLIN
OINTMENT (GRAM) TOPICAL
Status: DISCONTINUED | OUTPATIENT
Start: 2021-10-07 | End: 2021-10-09 | Stop reason: HOSPADM

## 2021-10-07 RX ORDER — CARBOPROST TROMETHAMINE 250 UG/ML
250 INJECTION, SOLUTION INTRAMUSCULAR
Status: DISCONTINUED | OUTPATIENT
Start: 2021-10-07 | End: 2021-10-07 | Stop reason: HOSPADM

## 2021-10-07 RX ORDER — FENTANYL CITRATE 50 UG/ML
50-100 INJECTION, SOLUTION INTRAMUSCULAR; INTRAVENOUS
Status: DISCONTINUED | OUTPATIENT
Start: 2021-10-07 | End: 2021-10-07 | Stop reason: HOSPADM

## 2021-10-07 RX ORDER — MISOPROSTOL 200 UG/1
800 TABLET ORAL
Status: DISCONTINUED | OUTPATIENT
Start: 2021-10-07 | End: 2021-10-07 | Stop reason: HOSPADM

## 2021-10-07 RX ORDER — NALBUPHINE HYDROCHLORIDE 10 MG/ML
2.5-5 INJECTION, SOLUTION INTRAMUSCULAR; INTRAVENOUS; SUBCUTANEOUS EVERY 6 HOURS PRN
Status: DISCONTINUED | OUTPATIENT
Start: 2021-10-07 | End: 2021-10-09 | Stop reason: HOSPADM

## 2021-10-07 RX ORDER — OXYTOCIN 10 [USP'U]/ML
10 INJECTION, SOLUTION INTRAMUSCULAR; INTRAVENOUS
Status: DISCONTINUED | OUTPATIENT
Start: 2021-10-07 | End: 2021-10-09 | Stop reason: HOSPADM

## 2021-10-07 RX ORDER — DOCUSATE SODIUM 100 MG/1
100 CAPSULE, LIQUID FILLED ORAL DAILY
Status: DISCONTINUED | OUTPATIENT
Start: 2021-10-08 | End: 2021-10-09 | Stop reason: HOSPADM

## 2021-10-07 RX ORDER — EPHEDRINE SULFATE 50 MG/ML
5 INJECTION, SOLUTION INTRAMUSCULAR; INTRAVENOUS; SUBCUTANEOUS
Status: DISCONTINUED | OUTPATIENT
Start: 2021-10-07 | End: 2021-10-07 | Stop reason: HOSPADM

## 2021-10-07 RX ORDER — METHYLERGONOVINE MALEATE 0.2 MG/ML
200 INJECTION INTRAVENOUS
Status: DISCONTINUED | OUTPATIENT
Start: 2021-10-07 | End: 2021-10-09 | Stop reason: HOSPADM

## 2021-10-07 RX ORDER — MISOPROSTOL 200 UG/1
800 TABLET ORAL
Status: DISCONTINUED | OUTPATIENT
Start: 2021-10-07 | End: 2021-10-09 | Stop reason: HOSPADM

## 2021-10-07 RX ORDER — PENICILLIN G 3000000 [IU]/50ML
3 INJECTION, SOLUTION INTRAVENOUS EVERY 4 HOURS
Status: DISCONTINUED | OUTPATIENT
Start: 2021-10-07 | End: 2021-10-07 | Stop reason: HOSPADM

## 2021-10-07 RX ORDER — IBUPROFEN 800 MG/1
800 TABLET, FILM COATED ORAL EVERY 6 HOURS PRN
Status: DISCONTINUED | OUTPATIENT
Start: 2021-10-07 | End: 2021-10-09 | Stop reason: HOSPADM

## 2021-10-07 RX ORDER — CEPHALEXIN 500 MG/1
500 CAPSULE ORAL DAILY
COMMUNITY

## 2021-10-07 RX ADMIN — LIDOCAINE HYDROCHLORIDE,EPINEPHRINE BITARTRATE 3 ML: 15; .005 INJECTION, SOLUTION EPIDURAL; INFILTRATION; INTRACAUDAL; PERINEURAL at 19:20

## 2021-10-07 RX ADMIN — PENICILLIN G 3 MILLION UNITS: 3000000 INJECTION, SOLUTION INTRAVENOUS at 14:21

## 2021-10-07 RX ADMIN — SODIUM CHLORIDE, POTASSIUM CHLORIDE, SODIUM LACTATE AND CALCIUM CHLORIDE 1000 ML: 600; 310; 30; 20 INJECTION, SOLUTION INTRAVENOUS at 18:09

## 2021-10-07 RX ADMIN — SODIUM CHLORIDE, POTASSIUM CHLORIDE, SODIUM LACTATE AND CALCIUM CHLORIDE: 600; 310; 30; 20 INJECTION, SOLUTION INTRAVENOUS at 10:15

## 2021-10-07 RX ADMIN — SODIUM CHLORIDE, POTASSIUM CHLORIDE, SODIUM LACTATE AND CALCIUM CHLORIDE: 600; 310; 30; 20 INJECTION, SOLUTION INTRAVENOUS at 19:29

## 2021-10-07 RX ADMIN — Medication: at 19:17

## 2021-10-07 RX ADMIN — BUPIVACAINE HYDROCHLORIDE 8 ML: 2.5 INJECTION, SOLUTION EPIDURAL; INFILTRATION; INTRACAUDAL at 19:25

## 2021-10-07 RX ADMIN — PENICILLIN G POTASSIUM 5 MILLION UNITS: 5000000 POWDER, FOR SOLUTION INTRAMUSCULAR; INTRAPLEURAL; INTRATHECAL; INTRAVENOUS at 10:36

## 2021-10-07 RX ADMIN — PENICILLIN G 3 MILLION UNITS: 3000000 INJECTION, SOLUTION INTRAVENOUS at 18:14

## 2021-10-07 RX ADMIN — BUPIVACAINE HYDROCHLORIDE 8 ML: 2.5 INJECTION, SOLUTION EPIDURAL; INFILTRATION; INTRACAUDAL at 07:25

## 2021-10-07 RX ADMIN — Medication 2 MILLI-UNITS/MIN: at 10:42

## 2021-10-07 ASSESSMENT — MIFFLIN-ST. JEOR: SCORE: 1696.48

## 2021-10-07 NOTE — H&P
[facility] Labor and Delivery History and Physical    Gely Christianson MRN# [id number]   Age: 36 year old YOB: 1985     Date of Admission:  10/7/2021    Primary care provider: Gabrielle Keane           Chief Complaint:   Gely Christianson is a 36 year old female who is 41w3d pregnant and being admitted for post term IOL. She has no complaints.           Pregnancy history:     OBSTETRIC HISTORY:    OB History    Para Term  AB Living   3 2 2 0 0 2   SAB TAB Ectopic Multiple Live Births   0 0 0 0 2      # Outcome Date GA Lbr Shaq/2nd Weight Sex Delivery Anes PTL Lv   3 Current            2 Term 20 41w0d       JASS   1 Term 10/29/17 40w0d    Vag-Vacuum   JASS       EDC: Estimated Date of Delivery: 21    Prenatal complications:  1. AMA  2. Acquired hydronephrosis with UPJ obstruction  3. GBS +    Prenatal Labs:   Lab Results   Component Value Date    AS Negative 10/07/2021       GBS Status: GBS+    Active Problem List  Patient Active Problem List   Diagnosis     Pregnant     Normal delivery     Encounter for triage in pregnant patient     Encounter for elective induction of labor       Medication Prior to Admission  Medications Prior to Admission   Medication Sig Dispense Refill Last Dose     Prenatal Vit-Fe Fumarate-FA (PRENATAL MULTIVITAMIN W/IRON) 27-0.8 MG tablet Take 1 tablet by mouth daily      .        Maternal Past Medical History:     Past Medical History:   Diagnosis Date     Renal disease     chronic UPJ obstruction of right kidney                          Review of Systems:   The Review of Systems is negative other than noted in the HPI          Physical Exam:   All vitals stable  Gen: NAD  Abd: gravid, soft, NTTP  SVE: 3/60/-3  FHT: category 1  TOCO: occasional ctx                     Assessment:   Gely Christianson is a 41w3d pregnant female admitted with plan for IOL.          Plan:   Admit - see IP orders. Start pitocin and PCN. Anticipate .      Dorys Salgado MD

## 2021-10-07 NOTE — PROGRESS NOTES
Oxytocin stopped while patient moved to labor room.  Restarted when EFM available.  Will continue to increase per protocol.

## 2021-10-07 NOTE — PROGRESS NOTES
S: pt feels contractions every few minutes but they are not painful    O: AF VSS   SVE: 4-5/70/-1, AROM with clear fluid   FHT: category 1   TOCO: q2-3 min    A/P: IOL at 41+3. When RN checked her about an hour ago there at not been any cervical change compared to this morning despite pitocin at 18. On my exam now, progress noted. Recommended AROM to further augment labor and patient agreed. Will continue pitocin. Epidural prn. Anticipate . She will get 3rd dose of PCN soon.

## 2021-10-07 NOTE — PROGRESS NOTES
Pitocin titration held at 1515 r/t abraham monitoring stopped working and changed to US and [place].  Will resume if appropriate.

## 2021-10-07 NOTE — PROGRESS NOTES
All charting on this patient that is signed by Eden Lynn RN is actually charted by Enma Geller RN.  Computer log in was incorrect.

## 2021-10-07 NOTE — CONSULTS
"ACUPUNCTURIST TREATMENT NOTE    Name: Gely Christianson  :  1985  MRN:  [id number]    Acupuncture Treatment  Patient Type: Maternity  Intervention Reason: Other  Patient complaint:: labor induction support  Initial insertions: (R) LI 4, (R) Sp 6,  BL 31, BL 32              \"Risks and benefits of acupuncture were discussed with patient. Consent for treatment was given. We thank you for the referral.\"     Leighann Clay L.Ac.     Date:  10/7/2021  Time:  1:47 PM    "

## 2021-10-08 PROCEDURE — 120N000001 HC R&B MED SURG/OB

## 2021-10-08 PROCEDURE — 250N000013 HC RX MED GY IP 250 OP 250 PS 637: Performed by: OBSTETRICS & GYNECOLOGY

## 2021-10-08 RX ADMIN — ACETAMINOPHEN 650 MG: 325 TABLET ORAL at 15:06

## 2021-10-08 RX ADMIN — IBUPROFEN 800 MG: 800 TABLET, FILM COATED ORAL at 00:00

## 2021-10-08 RX ADMIN — IBUPROFEN 800 MG: 800 TABLET, FILM COATED ORAL at 22:52

## 2021-10-08 RX ADMIN — Medication: at 00:40

## 2021-10-08 RX ADMIN — IBUPROFEN 800 MG: 800 TABLET, FILM COATED ORAL at 10:15

## 2021-10-08 RX ADMIN — DOCUSATE SODIUM 100 MG: 100 CAPSULE, LIQUID FILLED ORAL at 10:15

## 2021-10-08 RX ADMIN — ACETAMINOPHEN 650 MG: 325 TABLET ORAL at 20:51

## 2021-10-08 RX ADMIN — IBUPROFEN 800 MG: 800 TABLET, FILM COATED ORAL at 15:59

## 2021-10-08 RX ADMIN — ACETAMINOPHEN 650 MG: 325 TABLET ORAL at 06:04

## 2021-10-08 NOTE — L&D DELIVERY NOTE
OB Vaginal Delivery Note    Gely Christianson MRN# [id number]   Age: 36 year old YOB: 1985       GA: 41w3d  GP:   Prenatal complications: AMA, acquired hydronephrosis with UPJ obstruction, GBS+, postterm pregnancy  Labor Complications: Shoulder Dystocia   EBL:   mL  Delivery QBL:  50 ml  Delivery Type: Vaginal, Spontaneous   ROM to Delivery Time: (Delivered) Hours: 3 Minutes: 38   Weight:  pending   1 Minute 5 Minute 10 Minute   Apgar Totals: 8   9        SASKIA GILBERT;GELY HILL;COLLEEN BRANCH     Delivery Details:  Gely Christianson, a 36 year old  female progressed to complete with oxytocin and AROM and delivered a viable infant with apgars of 8  and 9 . Delivery was via vaginal, spontaneous  to a sterile field under spinal  anesthesia. Infant delivered in vertex  left  occiput  anterior  position. Shoulder dystocia occurred with duration of 1 minute and relieved with Torres. Infant was placed on mom's chest. After a delay, the cord was clamped, cut twice and 3 vessels  were noted.     Cord complications: none   Placenta delivered at 10/7/2021  9:48 PM . Placental disposition was Hospital disposal . Fundal massage performed and fundus found to be firm.     Episiotomy: none    Perineum, vagina, cervix were inspected, and the following lacerations were noted:   Perineal lacerations: none     Excellent hemostasis was noted. Needle count correct. Infant and patient in delivery room in good and stable condition.        Shantal Christianson-Gely [[id number]]    Labor Event Times    Dilation complete date: 10/7/21 Complete time:  9:34 PM   Start pushing date/time: 10/7/2021 2139      Labor Events     labor?: No   steroids: None  Labor Type: Induction/Cervical ripening  Predominate monitoring during 1st stage: continuous electronic fetal monitoring     Antibiotics received during labor?: Yes  Reason for Antibiotics: GBS  Antibiotics received for GBS:  Penicillin  Antibiotics Given (GBS): Greater than 4 hours prior to delivery     Rupture identifier: Sac 1  Rupture date/time: 10/7/21 180   Rupture type: Artificial Rupture of Membranes  Fluid color: Clear  Fluid odor: Normal     Induction: Oxytocin, AROM  Induction date/time: 10/7/21 1045   Cervical ripening date/time:     Indications for induction: Post-term Gestation     1:1 continuous labor support provided by?: RN       Delivery/Placenta Date and Time    Delivery Date: 10/7/21 Delivery Time:  9:44 PM   Placenta Date/Time: 10/7/2021  9:48 PM  Oxytocin given at the time of delivery: after delivery of baby  Delivering clinician: Dorys Salgado MD   Other personnel present at delivery:  Provider Role   Saskia Bishop RN O'Neal, Emily R, RN Callahan, Lauren Axelrod, MD          Vaginal Counts          Needles Suture Needles Sponges (RETIRED) Instruments   Initial counts 0 0 0    Added to count       Relief counts       Final counts 0 0 0          Placed during labor Accounted for at the end of labor   FSE NA    IUPC NA NA   Cervadil NA NA                     Apgars    Living status: Living   1 Minute 5 Minute 10 Minute 15 Minute 20 Minute   Skin color: 0  1       Heart rate: 2  2       Reflex irritability: 2  2       Muscle tone: 2  2       Respiratory effort: 2  2       Total: 8  9       Apgars assigned by: SASKIA BISHOP RN     Cord    Vessels: 3 Vessels    Cord Complications: None               Cord Blood Disposition: Lab    Gases Sent?: No    Delayed cord clamping?: Yes    Cord Clamping Delay (seconds):  seconds    Stem cell collection?: No       [place] Resuscitation    Methods: None     Skin to Skin and Feeding Plan    Skin to skin initiation date/time: 1/10/1841    Skin to skin with: Mother  Skin to skin end date/time:        Labor Events and Shoulder Dystocia    Fetal Tracing Prior to Delivery: Category 1  Shoulder dystocia present?: Pos  Anterior shoulder: right    Gentle attempt at  traction, assisted by maternal expulsive forces?: Yes       First Maneuver: Torres maneuver       Delivery (Maternal) (Provider to Complete) ([id number])    Episiotomy: None  Perineal lacerations: None    Repair suture: None  Genital tract inspection done: Pos     Blood Loss  Mother: Gely Christianson #[id number]   Start of Mother's Information    Delivery Blood Loss  10/07/21 0944 - 10/07/21 2200    None           End of Mother's Information  Mother: Gely Christianson #[id number]          Delivery - Provider to Complete ([id number])    Delivering clinician: Dorys Salgado MD  Delivery Type (Choose the 1 that will go to the Birth History): Vaginal, Spontaneous                   Other personnel:  Provider Role   Giselle Bishop RN O'Neal, Emily R, RN Callahan, Lauren Axelrod, MD                 Placenta    Date/Time: 10/7/2021  9:48 PM  Removal: Spontaneous  Disposition: Hospital disposal           Anesthesia    Method: Spinal     Analgesic:  BIRTH HISTORY: ANALGESIC   FENTANYL 2 MCG/ML BUPIVACAINE 0.125% IN NS BOLUS             Presentation and Position    Presentation: Vertex    Position: Left Occiput Anterior                 Dorys Salgado MD

## 2021-10-08 NOTE — CONSULTS
Integrative Therapy Consult    Healing PresenceYes  Essential Oils: Topical (EO/Topical Oil)     An -  HC, Lavender Massage Oil - HC       Healing Music:       Breathwork:       Guided Imagery:       Acupressure:       Oshibori:       Energy Therapy:       Healing Touch:       Reiki:       Qi Gong:     Massage: Foot      Targeted Massage:    Sleep Promotion:       Other Therapy:       Intervention Reason: Edema     Pre and Post Session Scores: Patient Desires Treatment: yes                           Delivery:         Referrals:      Anna Ferreira

## 2021-10-08 NOTE — ANESTHESIA PREPROCEDURE EVALUATION
Anesthesia Pre-Procedure Evaluation    Patient: Gely Christianson   MRN: [id number] : 1985        Preoperative Diagnosis: * No pre-op diagnosis entered *    Procedure : * No procedures listed *          Past Medical History:   Diagnosis Date     Renal disease     chronic UPJ obstruction of right kidney      Past Surgical History:   Procedure Laterality Date     TOOTH EXTRACTION       WISDOM TOOTH EXTRACTION        No Known Allergies   Social History     Tobacco Use     Smoking status: Never Smoker     Smokeless tobacco: Never Used   Substance Use Topics     Alcohol use: Not Currently      Wt Readings from Last 1 Encounters:   10/07/21 96.8 kg (213 lb 6.4 oz)        Anesthesia Evaluation   Pt has had prior anesthetic. Type: OB Labor Epidural.    No history of anesthetic complications       ROS/MED HX  ENT/Pulmonary:    (-) asthma   Neurologic:       Cardiovascular:    (-) PIH   METS/Exercise Tolerance:     Hematologic:     (+) no anticoagulation therapy,     Musculoskeletal:       GI/Hepatic:       Renal/Genitourinary:       Endo:     (+) Obesity,     Psychiatric/Substance Use:       Infectious Disease:       Malignancy:       Other:            Physical Exam    Airway        Mallampati: II   TM distance: > 3 FB   Neck ROM: full     Respiratory Devices and Support         Dental  no notable dental history         Cardiovascular   cardiovascular exam normal          Pulmonary   pulmonary exam normal                OUTSIDE LABS:  CBC: No results found for: WBC, HGB, HCT, PLT  BMP: No results found for: NA, POTASSIUM, CHLORIDE, CO2, BUN, CR, GLC  COAGS: No results found for: PTT, INR, FIBR  POC: No results found for: BGM, HCG, HCGS  HEPATIC: No results found for: ALBUMIN, PROTTOTAL, ALT, AST, GGT, ALKPHOS, BILITOTAL, BILIDIRECT, ROBERTO  OTHER: No results found for: PH, LACT, A1C, HERO, PHOS, MAG, LIPASE, AMYLASE, TSH, T4, T3, CRP, SED    Anesthesia Plan    ASA Status:  3      Anesthesia Type: Epidural.               Consents    Anesthesia Plan(s) and associated risks, benefits, and realistic alternatives discussed. Questions answered and patient/representative(s) expressed understanding.     - Discussed with:  Patient, Spouse         Postoperative Care            Comments:                Ammy Ricks MD

## 2021-10-08 NOTE — PLAN OF CARE
Problem: Adult Inpatient Plan of Care  Goal: Plan of Care Review  Outcome: Improving  Flowsheets (Taken 10/8/2021 3749)  Plan of Care Reviewed With: patient  Progress: no change   Patient and infant doing well.

## 2021-10-08 NOTE — ANESTHESIA POSTPROCEDURE EVALUATION
Patient: Gely Christianson    Procedure: * No procedures listed *       Diagnosis:* No pre-op diagnosis entered *  Diagnosis Additional Information: No value filed.    Anesthesia Type:  Epidural    Note:  Disposition: Inpatient   Postop Pain Control: Uneventful            Sign Out: Well controlled pain   PONV: No   Neuro/Psych: Uneventful            Sign Out: Acceptable/Baseline neuro status   Airway/Respiratory: Uneventful            Sign Out: Acceptable/Baseline resp. status   CV/Hemodynamics: Uneventful            Sign Out: Acceptable CV status; No obvious hypovolemia; No obvious fluid overload   Other NRE: NONE   DID A NON-ROUTINE EVENT OCCUR? No    Event details/Postop Comments:  No immediate headache, neurological injury from epidural.            Last vitals:  Vitals Value Taken Time   BP     Temp     Pulse     Resp     SpO2         Electronically Signed By: Ammy Ricks MD  October 8, 2021  6:49 AM

## 2021-10-08 NOTE — PROGRESS NOTES
"Postpartum Day 1: Vaginal Delivery    Subjective:  The patient feels well. She has no emotional concerns. Pain is well controlled with current medications.  She is voiding and passing gas. The baby is well.     Objective   The patient has a blood pressure which is within the normal range. The amount and color of the lochia is appropriate for the duration of recovery. The uterine fundus is firm. Urinary output is adequate. The patient is passing flatus.The patient is ambulating well. The patient is tolerating a normal diet.     Exam:   /83 (BP Location: Left arm)   Pulse 75   Temp 97.9  F (36.6  C) (Oral)   Resp 14   Ht 1.727 m (5' 8\")   Wt 96.8 kg (213 lb 6.4 oz)   LMP 12/21/2020   SpO2 96%   Breastfeeding Unknown   BMI 32.45 kg/m    General: NAD  Abdomen: soft, NT   Ext: no pain     Impression:   Normal postpartum course.     Plan:   Continue current care.  Likely home tomorrow.      Gabrielle Keane MD       "

## 2021-10-08 NOTE — LACTATION NOTE
Met with Gely to see how breast feeding is going.  She has sore nipples and is using nipple shields and lanolin after feeding to keep her nipples from sticking to her top.  I gave her MotherLove nipple cream and breast shells and instructed on use.  We reviewed best positions for feeding a  and offered to assess latch and transfer.  We reviewed lactation resources in education folder and the breastfeeding essential book. Will follow up as needed.

## 2021-10-08 NOTE — ANESTHESIA PROCEDURE NOTES
Epidural catheter Procedure Note    Pre-Procedure   Staff -        Anesthesiologist:  Ammy Ricks MD       Performed By: anesthesiologist       Location: OB       Procedure Start/Stop Times: 10/7/2021 7:10 AM and 10/7/2021 7:28 AM       Pre-Anesthestic Checklist: patient identified, IV checked, risks and benefits discussed, informed consent, monitors and equipment checked, pre-op evaluation, at physician/surgeon's request and post-op pain management  Timeout:       Correct Patient: Yes        Correct Procedure: Yes        Correct Site: Yes        Correct Position: Yes   Procedure Documentation  Procedure: epidural catheter       Patient Position: sitting       Patient Prep/Sterile Barriers: sterile gloves, mask, patient draped       Skin prep: Chloraprep      Local skin infiltrated with mL of 1% lidocaine.        Insertion Site: L3-4. (midline approach).       Technique: LORT saline        NEMO at 7 cm.       Needle Type: SpotOnWay       Needle Gauge: 18.        Needle Length (Inches): 3.5         Catheter threaded easily.         4 cm epidural space.         Threaded 11 cm at skin.         # of attempts: 1 and  # of redirects:  0    Assessment/Narrative         Paresthesias: No.       Test dose of 3 mL lidocaine 1.5% w/ 1:200,000 epinephrine at 07:20 CDT.         Test dose negative, 3 minutes after injection, for signs of intravascular, subdural, or intrathecal injection.       Insertion/Infusion Method: LORT saline       Aspiration negative for Heme or CSF via Epidural Catheter.    Medication(s) Administered   0.25% Bupivacaine PF (Epidural), 8 mL  Medication Administration Time: 10/7/2021 7:25 AM     Comments:  R/B/A of epidural discussed including but not limited to infection, bleeding, headache, failed block and patient wished to proceed. Dickens NEMO on first pass, no paresthesias with threading of catheter. Negative aspiration prior to and after test dose. No immediate complications patient tolerated procedure  well.

## 2021-10-08 NOTE — CONSULTS
"ACUPUNCTURIST TREATMENT NOTE    Name: Gely Christianson  :  1985  MRN:  [id number]    Acupuncture Treatment  Patient Type: Maternity  Intervention Reason: Lactation  Patient complaint:: lactation support  Initial insertions: Tomi 17, LI 4, SI 1, Sp 6, Marlen 3      \"Risks and benefits of acupuncture were discussed with patient. Consent for treatment was given. We thank you for the referral.\"     Leighann Clay L.Ac.     Date:  10/8/2021  Time:  2:16 PM    "

## 2021-10-09 VITALS
HEIGHT: 68 IN | RESPIRATION RATE: 16 BRPM | SYSTOLIC BLOOD PRESSURE: 127 MMHG | HEART RATE: 73 BPM | OXYGEN SATURATION: 95 % | TEMPERATURE: 98 F | DIASTOLIC BLOOD PRESSURE: 78 MMHG | WEIGHT: 213.4 LBS | BODY MASS INDEX: 32.34 KG/M2

## 2021-10-09 PROBLEM — Z36.89 ENCOUNTER FOR TRIAGE IN PREGNANT PATIENT: Status: RESOLVED | Noted: 2021-10-07 | Resolved: 2021-10-09

## 2021-10-09 PROCEDURE — 250N000013 HC RX MED GY IP 250 OP 250 PS 637: Performed by: OBSTETRICS & GYNECOLOGY

## 2021-10-09 RX ORDER — ACETAMINOPHEN 325 MG/1
325-650 TABLET ORAL EVERY 4 HOURS PRN
COMMUNITY
Start: 2021-10-09

## 2021-10-09 RX ORDER — IBUPROFEN 200 MG
600-800 TABLET ORAL EVERY 6 HOURS PRN
Status: ON HOLD | COMMUNITY
Start: 2021-10-09 | End: 2024-08-08

## 2021-10-09 RX ADMIN — Medication: at 11:36

## 2021-10-09 RX ADMIN — IBUPROFEN 800 MG: 800 TABLET, FILM COATED ORAL at 08:13

## 2021-10-09 RX ADMIN — DOCUSATE SODIUM 100 MG: 100 CAPSULE, LIQUID FILLED ORAL at 08:12

## 2021-10-09 RX ADMIN — ACETAMINOPHEN 650 MG: 325 TABLET ORAL at 03:21

## 2021-10-09 NOTE — LACTATION NOTE
Follow up with Gely to see how breast feeding is going.  She feels that her milk is coming in and baby is getting better latches and decreased nipple pain.  I issued her a Medela Maxflow breast pump through her insurance.  Will follow up as needed.

## 2021-10-09 NOTE — DISCHARGE SUMMARY
[facility] Discharge Summary    Gely Christianson MRN# [id number]   Age: 36 year old YOB: 1985     Date of Admission:  10/7/2021  Date of Discharge::  10/9/2021  Admitting Physician:  Dorys Salgado MD  Discharge Physician:  Suzie Garcia MD     Home clinic: [facility]          Admission Diagnoses:   Induction of labor  Postdates pregnancy  Hydronephrosis          Discharge Diagnosis:     Normal spontaneous vaginal delivery  Intrauterine pregnancy at 41.3 weeks gestation          Procedures:     Procedure(s): None            Medications Prior to Admission:     Medications Prior to Admission   Medication Sig Dispense Refill Last Dose     cephALEXin (KEFLEX) 500 MG capsule Take 500 mg by mouth daily    10/6/2021 at Unknown time     Prenatal Vit-Fe Fumarate-FA (PRENATAL MULTIVITAMIN W/IRON) 27-0.8 MG tablet Take 1 tablet by mouth daily   10/6/2021 at Unknown time             Discharge Medications:     Current Discharge Medication List      START taking these medications    Details   acetaminophen (TYLENOL) 325 MG tablet Take 1-2 tablets (325-650 mg) by mouth every 4 hours as needed for mild pain    Associated Diagnoses: Normal delivery      ibuprofen (ADVIL/MOTRIN) 200 MG tablet Take 3-4 tablets (600-800 mg) by mouth every 6 hours as needed for moderate pain or other (cramping)    Associated Diagnoses: Normal delivery         CONTINUE these medications which have NOT CHANGED    Details   cephALEXin (KEFLEX) 500 MG capsule Take 500 mg by mouth daily       Prenatal Vit-Fe Fumarate-FA (PRENATAL MULTIVITAMIN W/IRON) 27-0.8 MG tablet Take 1 tablet by mouth daily                 Consultations:   No consultations were requested during this admission          Hospital Course:   See admission H&P and delivery summary for details. The patient's hospital course was unremarkable.  On discharge, her pain was well controlled. Vaginal bleeding/lochia is appropriate  for postpartum state.  She is voiding without difficulty, ambulating well and tolerating a normal diet.  Vital signs are stable on the date of discharge, baby is doing well.     Last hemoglobin: No results found for: HGB          Discharge Instructions and Follow-Up:     Discharge diet: Regular   Discharge activity: Pelvic rest: abstain from intercourse and do not use tampons for 6 week(s)   Discharge follow-up: Follow-up in office for routine postpartum care at 6 weeks, sooner with any concerns or issues   Wound care: Drink plenty of fluids           Discharge Disposition:     Discharged to home      Attestation:  I have reviewed today's vital signs, notes, medications, labs.    Suzie Garcia MD

## 2021-10-09 NOTE — PLAN OF CARE
Problem: Adult Inpatient Plan of Care  Goal: Optimal Comfort and Wellbeing  Outcome: Adequate for Discharge     Problem: Adult Inpatient Plan of Care  Goal: Readiness for Transition of Care  Outcome: Adequate for Discharge

## 2021-10-10 ENCOUNTER — HEALTH MAINTENANCE LETTER (OUTPATIENT)
Age: 36
End: 2021-10-10

## 2021-12-07 ENCOUNTER — MEDICAL CORRESPONDENCE (OUTPATIENT)
Dept: HEALTH INFORMATION MANAGEMENT | Facility: CLINIC | Age: 36
End: 2021-12-07
Payer: COMMERCIAL

## 2022-03-26 ENCOUNTER — HEALTH MAINTENANCE LETTER (OUTPATIENT)
Age: 37
End: 2022-03-26

## 2022-09-18 ENCOUNTER — HEALTH MAINTENANCE LETTER (OUTPATIENT)
Age: 37
End: 2022-09-18

## 2023-05-06 ENCOUNTER — HEALTH MAINTENANCE LETTER (OUTPATIENT)
Age: 38
End: 2023-05-06

## 2023-09-07 ENCOUNTER — HOSPITAL ENCOUNTER (OUTPATIENT)
Dept: MAMMOGRAPHY | Facility: CLINIC | Age: 38
Discharge: HOME | End: 2023-09-07
Attending: OBSTETRICS & GYNECOLOGY | Admitting: OBSTETRICS & GYNECOLOGY
Payer: COMMERCIAL

## 2023-09-07 DIAGNOSIS — Z12.31 VISIT FOR SCREENING MAMMOGRAM: ICD-10-CM

## 2023-09-07 PROCEDURE — 77067 SCR MAMMO BI INCL CAD: CPT

## 2023-09-22 ENCOUNTER — HOSPITAL ENCOUNTER (EMERGENCY)
Facility: CLINIC | Age: 38
Discharge: LEFT BEFORE DISCHARGE | End: 2023-09-22
Payer: COMMERCIAL

## 2023-09-22 NOTE — ED NOTES
Expected Patient Referral to ED  5:25 PM    Referring Clinic/Provider:  [facility]     Reason for referral/Clinical facts:  Hx of severe R hydronephrosis, chronic obstruction. Follows closely with [facility]. Yesterday started having R flank pain. CT scan today with severe R hydro (increased), new perinephric fluid/stranding (poss forniceal rupture), new ectopic ureterocele. Radiologist recommended urology consult. Given PO zofran and norco x2. Labs okay. No UTI.     Recommendations provided:  Send to ED for further evaluation    Caller was informed that this institution does possess the capabilities and/or resources to provide for patient and should be transferred to our facility.    Discussed that if direct admit is sought and any hurdles are encountered, this ED would be happy to see the patient and evaluate.    Informed caller that recommendations provided are recommendations based only on the facts provided and that they responsible to accept or reject the advice, or to seek a formal in person consultation as needed and that this ED will see/treat patient should they arrive.      Candi Mccarty MD  [facility] EMERGENCY ROOM  [address] [id number] [phone]       Candi Mccarty MD  09/22/23 1865

## 2024-01-19 ENCOUNTER — TRANSFERRED RECORDS (OUTPATIENT)
Dept: HEALTH INFORMATION MANAGEMENT | Facility: CLINIC | Age: 39
End: 2024-01-19
Payer: COMMERCIAL

## 2024-01-22 ENCOUNTER — MEDICAL CORRESPONDENCE (OUTPATIENT)
Dept: HEALTH INFORMATION MANAGEMENT | Facility: CLINIC | Age: 39
End: 2024-01-22
Payer: COMMERCIAL

## 2024-01-22 ENCOUNTER — TRANSCRIBE ORDERS (OUTPATIENT)
Dept: MATERNAL FETAL MEDICINE | Facility: CLINIC | Age: 39
End: 2024-01-22
Payer: COMMERCIAL

## 2024-01-22 DIAGNOSIS — O26.90 PREGNANCY RELATED CONDITION, ANTEPARTUM: Primary | ICD-10-CM

## 2024-02-21 ENCOUNTER — TRANSFERRED RECORDS (OUTPATIENT)
Dept: HEALTH INFORMATION MANAGEMENT | Facility: CLINIC | Age: 39
End: 2024-02-21
Payer: COMMERCIAL

## 2024-03-13 ENCOUNTER — PRE VISIT (OUTPATIENT)
Dept: MATERNAL FETAL MEDICINE | Facility: HOSPITAL | Age: 39
End: 2024-03-13
Payer: COMMERCIAL

## 2024-03-15 ENCOUNTER — ANCILLARY PROCEDURE (OUTPATIENT)
Dept: ULTRASOUND IMAGING | Facility: HOSPITAL | Age: 39
End: 2024-03-15
Attending: OBSTETRICS & GYNECOLOGY
Payer: COMMERCIAL

## 2024-03-15 ENCOUNTER — OFFICE VISIT (OUTPATIENT)
Dept: MATERNAL FETAL MEDICINE | Facility: HOSPITAL | Age: 39
End: 2024-03-15
Attending: OBSTETRICS & GYNECOLOGY
Payer: COMMERCIAL

## 2024-03-15 DIAGNOSIS — O09.522 MULTIGRAVIDA OF ADVANCED MATERNAL AGE IN SECOND TRIMESTER: Primary | ICD-10-CM

## 2024-03-15 DIAGNOSIS — O26.90 PREGNANCY RELATED CONDITION, ANTEPARTUM: ICD-10-CM

## 2024-03-15 PROCEDURE — 76811 OB US DETAILED SNGL FETUS: CPT

## 2024-03-15 PROCEDURE — 99207 PR NO CHARGE LOS: CPT | Performed by: OBSTETRICS & GYNECOLOGY

## 2024-03-15 PROCEDURE — 76811 OB US DETAILED SNGL FETUS: CPT | Mod: 26 | Performed by: OBSTETRICS & GYNECOLOGY

## 2024-03-15 PROCEDURE — 99213 OFFICE O/P EST LOW 20 MIN: CPT | Mod: 25 | Performed by: OBSTETRICS & GYNECOLOGY

## 2024-03-15 NOTE — PROGRESS NOTES
Please see full imaging report from ViewPoint program under imaging tab.    Thank-you for referring your patient for ultrasound assessment.    I discussed the findings on today's ultrasound with the patient. I reviewed the limitations of ultrasound both in detecting aneuploidy and structural abnormalities.  Ultrasound can routinely detect 80-90% of structural abnormalities. She has not had genetic screening this pregnancy.    Return to primary provider for continued prenatal care. We do recommend additional US, which we anticipate will be done with her primary OB provider team at [facility] and [facility]:   1. Growth US at 32 weeks  2. Weekly BPP at 36 weeks    If you have questions regarding today's evaluation or if we can be of further service, please contact the [facility].     I spent a total of 25 minutes on the date of this encounter including preparing to see the patient (reviewing medical records/tests), counseling and discussing the plan of care, documenting the visit in the electronic medical record, and communicating with other health care professionals and/or care coordination.    Khari Romero MD  Maternal Fetal Medicine

## 2024-03-15 NOTE — NURSING NOTE
Gely ZACHARY Christianson is a  at 19w6d who presents to [facility] for scheduled L2. Declined screening. SBAR given to Dr. Romero, see note in Epic.

## 2024-07-14 ENCOUNTER — HEALTH MAINTENANCE LETTER (OUTPATIENT)
Age: 39
End: 2024-07-14

## 2024-08-06 ENCOUNTER — ANESTHESIA EVENT (OUTPATIENT)
Dept: OBGYN | Facility: CLINIC | Age: 39
End: 2024-08-06
Payer: COMMERCIAL

## 2024-08-06 ENCOUNTER — MEDICAL CORRESPONDENCE (OUTPATIENT)
Dept: HEALTH INFORMATION MANAGEMENT | Facility: CLINIC | Age: 39
End: 2024-08-06

## 2024-08-06 ENCOUNTER — ANESTHESIA (OUTPATIENT)
Dept: OBGYN | Facility: CLINIC | Age: 39
End: 2024-08-06
Payer: COMMERCIAL

## 2024-08-06 PROCEDURE — 370N000003 HC ANESTHESIA WARD SERVICE: Performed by: STUDENT IN AN ORGANIZED HEALTH CARE EDUCATION/TRAINING PROGRAM

## 2024-08-06 NOTE — ANESTHESIA PREPROCEDURE EVALUATION
"Anesthesia Pre-Procedure Evaluation    Patient: Gely Christianson   MRN: [id number] : 1985        Procedure :   Induction       Past Medical History:   Diagnosis Date    Renal disease     chronic UPJ obstruction of right kidney      Past Surgical History:   Procedure Laterality Date    TOOTH EXTRACTION      WISDOM TOOTH EXTRACTION        No Known Allergies   Social History     Tobacco Use    Smoking status: Never    Smokeless tobacco: Never   Substance Use Topics    Alcohol use: Not Currently      Wt Readings from Last 1 Encounters:   24 104.3 kg (230 lb)        Anesthesia Evaluation            ROS/MED HX  ENT/Pulmonary:  - neg pulmonary ROS     Neurologic:  - neg neurologic ROS     Cardiovascular:  - neg cardiovascular ROS     METS/Exercise Tolerance: >4 METS    Hematologic:  - neg hematologic  ROS     Musculoskeletal:  - neg musculoskeletal ROS     GI/Hepatic:  - neg GI/hepatic ROS     Renal/Genitourinary:  - neg Renal ROS     Endo:  - neg endo ROS     Psychiatric/Substance Use:  - neg psychiatric ROS     Infectious Disease:  - neg infectious disease ROS     Malignancy:  - neg malignancy ROS     Other:      (+) Possibly pregnant, , ,         Physical Exam    Airway  airway exam normal           Respiratory Devices and Support         Dental           Cardiovascular   cardiovascular exam normal          Pulmonary   pulmonary exam normal                OUTSIDE LABS:  CBC:   Lab Results   Component Value Date    HGB 11.5 (L) 2024     BMP: No results found for: \"NA\", \"POTASSIUM\", \"CHLORIDE\", \"CO2\", \"BUN\", \"CR\", \"GLC\"  COAGS: No results found for: \"PTT\", \"INR\", \"FIBR\"  POC: No results found for: \"BGM\", \"HCG\", \"HCGS\"  HEPATIC: No results found for: \"ALBUMIN\", \"PROTTOTAL\", \"ALT\", \"AST\", \"GGT\", \"ALKPHOS\", \"BILITOTAL\", \"BILIDIRECT\", \"ROBERTO\"  OTHER: No results found for: \"PH\", \"LACT\", \"A1C\", \"HERO\", \"PHOS\", \"MAG\", \"LIPASE\", \"AMYLASE\", \"TSH\", \"T4\", \"T3\", \"CRP\", \"SED\"    Anesthesia Plan    ASA Status:  2     "   Anesthesia Type: Epidural.              Consents    Anesthesia Plan(s) and associated risks, benefits, and realistic alternatives discussed. Questions answered and patient/representative(s) expressed understanding.     - Discussed:     - Discussed with:  Patient            Postoperative Care            Comments:    Other Comments: Patient requests labor epidural. Chart reviewed, including labs. Patient interviewed and examined at bedside with RN present throughout. Discussed the procedure of epidural placement, expectations, and risks including but not limited to: bleeding, infection, damage to tissues under the skin (nerves, muscles, blood vessels), hypotension, headache, and epidural failure. All questions were answered.             Mikey Hernandez MD    I have reviewed the pertinent notes and labs in the chart from the past 30 days and (re)examined the patient.  Any updates or changes from those notes are reflected in this note.

## 2024-08-06 NOTE — ANESTHESIA PROCEDURE NOTES
"Epidural catheter Procedure Note    Pre-Procedure   Staff -        Anesthesiologist:  Mikey Hernandez MD       Performed By: anesthesiologist       Location: OB       Procedure Start/Stop Times: 8/6/2024 5:02 PM and 8/6/2024 5:20 PM       Pre-Anesthestic Checklist: patient identified, IV checked, risks and benefits discussed, informed consent, monitors and equipment checked, pre-op evaluation, at physician/surgeon's request and post-op pain management  Timeout:       Correct Patient: Yes        Correct Procedure: Yes        Correct Site: Yes        Correct Position: Yes   Procedure Documentation  Procedure: epidural catheter       Patient Position: sitting       Patient Prep/Sterile Barriers: sterile gloves, mask, patient draped       Skin prep: Chloraprep       Local skin infiltrated with mL of 2% lidocaine.        Insertion Site: L3-4.       Technique: LORT saline        NEMO at 5.5 cm.       Needle Type: NaphCare       Needle Gauge: 17.        Needle Length (Inches): 3.5        Catheter: 18 G.          Catheter threaded easily.         5 cm epidural space.         Threaded 10.5 cm at skin.         # of attempts: 1 and  # of redirects:  1    Assessment/Narrative         Paresthesias: No.       Test dose of 3 mL lidocaine 1.5% w/ 1:200,000 epinephrine at 17:17 CDT.         Test dose negative, 3 minutes after injection, for signs of intravascular, subdural, or intrathecal injection.       Insertion/Infusion Method: LORT saline       Aspiration negative for Heme or CSF via Epidural Catheter.    Medication(s) Administered   Medication Administration Time: 8/6/2024 5:02 PM     Comments:  NEMO w/ saline. Aspiration negative. Test dose negative. No motor block. Risks and benefits of the procedure discussed prior to placement. All patient questions answered.         FOR [facility] ([facility]) ONLY:   Pain Team Contact information: please page the Pain Team Via [employer] ([employer]). Search \"Pain\". During daytime hours, please page the " attending first. At night please page the resident first.

## 2024-08-07 NOTE — ANESTHESIA POSTPROCEDURE EVALUATION
Patient: Gely Christianson    Procedure: * No procedures listed *  Induction    Anesthesia Type:  Epidural    Note:  Disposition: Inpatient   Postop Pain Control: Uneventful            Sign Out: Well controlled pain   PONV: No   Neuro/Psych: Uneventful            Sign Out: Acceptable/Baseline neuro status   Airway/Respiratory: Uneventful            Sign Out: Acceptable/Baseline resp. status   CV/Hemodynamics: Uneventful            Sign Out: Acceptable CV status; No obvious hypovolemia; No obvious fluid overload   Other NRE:    DID A NON-ROUTINE EVENT OCCUR?     Event details/Postop Comments:  No headache. No back pain. The effects of the epidural have worn of. No need for follow up.       Last vitals:  Vitals:    08/06/24 2147 08/07/24 0100 08/07/24 0510   BP: 126/71 136/83 139/73   Pulse:  80 86   Resp:  16 16   Temp:  36.4  C (97.6  F) 36.5  C (97.7  F)   SpO2:          Electronically Signed By: José Luis Turner MD  August 7, 2024  12:07 PM

## 2024-08-09 ENCOUNTER — TELEPHONE (OUTPATIENT)
Dept: OBGYN | Facility: CLINIC | Age: 39
End: 2024-08-09
Payer: COMMERCIAL

## 2024-08-09 NOTE — TELEPHONE ENCOUNTER
OB Follow Up Phone Call        :   N/A    Language:   English    Discharge Follow-Up:  Follow-Up call by Outreach nurse: Message left for patient    Type of Delivery:      Feeding Method:  Breastfeeding    Comments:   Left message with [facility] Outreach phone number for patient to call back if desired. Reminded patient to schedule postpartum follow-up appointment as directed by PCP at discharge.  Encouraged patient to call clinic/PCP with questions or concerns.

## 2024-12-02 ENCOUNTER — TRANSFERRED RECORDS (OUTPATIENT)
Dept: HEALTH INFORMATION MANAGEMENT | Facility: CLINIC | Age: 39
End: 2024-12-02

## 2024-12-18 ENCOUNTER — MEDICAL CORRESPONDENCE (OUTPATIENT)
Dept: HEALTH INFORMATION MANAGEMENT | Facility: CLINIC | Age: 39
End: 2024-12-18
Payer: COMMERCIAL

## 2025-08-08 PROBLEM — N26.1 ATROPHIC KIDNEY: Status: ACTIVE | Noted: 2025-08-08

## 2025-08-11 ENCOUNTER — PATIENT OUTREACH (OUTPATIENT)
Dept: CARE COORDINATION | Facility: CLINIC | Age: 40
End: 2025-08-11
Payer: COMMERCIAL

## 2025-08-13 ENCOUNTER — PATIENT OUTREACH (OUTPATIENT)
Dept: CARE COORDINATION | Facility: CLINIC | Age: 40
End: 2025-08-13
Payer: COMMERCIAL